# Patient Record
Sex: FEMALE | Race: WHITE | NOT HISPANIC OR LATINO | Employment: FULL TIME | URBAN - METROPOLITAN AREA
[De-identification: names, ages, dates, MRNs, and addresses within clinical notes are randomized per-mention and may not be internally consistent; named-entity substitution may affect disease eponyms.]

---

## 2023-12-02 ENCOUNTER — HOSPITAL ENCOUNTER (EMERGENCY)
Facility: HOSPITAL | Age: 23
Discharge: HOME | End: 2023-12-02
Attending: STUDENT IN AN ORGANIZED HEALTH CARE EDUCATION/TRAINING PROGRAM
Payer: COMMERCIAL

## 2023-12-02 ENCOUNTER — APPOINTMENT (OUTPATIENT)
Dept: RADIOLOGY | Facility: HOSPITAL | Age: 23
End: 2023-12-02
Payer: COMMERCIAL

## 2023-12-02 VITALS
SYSTOLIC BLOOD PRESSURE: 135 MMHG | DIASTOLIC BLOOD PRESSURE: 89 MMHG | WEIGHT: 180 LBS | HEART RATE: 78 BPM | BODY MASS INDEX: 30.73 KG/M2 | TEMPERATURE: 96.8 F | OXYGEN SATURATION: 97 % | HEIGHT: 64 IN | RESPIRATION RATE: 16 BRPM

## 2023-12-02 DIAGNOSIS — R10.84 GENERALIZED ABDOMINAL PAIN: Primary | ICD-10-CM

## 2023-12-02 LAB
ALBUMIN SERPL BCP-MCNC: 4.3 G/DL (ref 3.4–5)
ALP SERPL-CCNC: 39 U/L (ref 33–110)
ALT SERPL W P-5'-P-CCNC: 11 U/L (ref 7–45)
ANION GAP SERPL CALC-SCNC: 10 MMOL/L (ref 10–20)
APPEARANCE UR: CLEAR
AST SERPL W P-5'-P-CCNC: 14 U/L (ref 9–39)
BASOPHILS # BLD AUTO: 0.03 X10*3/UL (ref 0–0.1)
BASOPHILS NFR BLD AUTO: 0.3 %
BILIRUB SERPL-MCNC: 0.5 MG/DL (ref 0–1.2)
BILIRUB UR STRIP.AUTO-MCNC: NEGATIVE MG/DL
BUN SERPL-MCNC: 9 MG/DL (ref 6–23)
CALCIUM SERPL-MCNC: 9.3 MG/DL (ref 8.6–10.3)
CHLORIDE SERPL-SCNC: 103 MMOL/L (ref 98–107)
CO2 SERPL-SCNC: 27 MMOL/L (ref 21–32)
COLOR UR: NORMAL
CREAT SERPL-MCNC: 0.55 MG/DL (ref 0.5–1.05)
EOSINOPHIL # BLD AUTO: 0.27 X10*3/UL (ref 0–0.7)
EOSINOPHIL NFR BLD AUTO: 2.7 %
ERYTHROCYTE [DISTWIDTH] IN BLOOD BY AUTOMATED COUNT: 11.9 % (ref 11.5–14.5)
GFR SERPL CREATININE-BSD FRML MDRD: >90 ML/MIN/1.73M*2
GLUCOSE SERPL-MCNC: 90 MG/DL (ref 74–99)
GLUCOSE UR STRIP.AUTO-MCNC: NEGATIVE MG/DL
HCG UR QL IA.RAPID: NEGATIVE
HCT VFR BLD AUTO: 41.5 % (ref 36–46)
HGB BLD-MCNC: 13.7 G/DL (ref 12–16)
HOLD SPECIMEN: NORMAL
IMM GRANULOCYTES # BLD AUTO: 0.02 X10*3/UL (ref 0–0.7)
IMM GRANULOCYTES NFR BLD AUTO: 0.2 % (ref 0–0.9)
KETONES UR STRIP.AUTO-MCNC: NEGATIVE MG/DL
LEUKOCYTE ESTERASE UR QL STRIP.AUTO: NEGATIVE
LIPASE SERPL-CCNC: 14 U/L (ref 9–82)
LYMPHOCYTES # BLD AUTO: 1.4 X10*3/UL (ref 1.2–4.8)
LYMPHOCYTES NFR BLD AUTO: 14.1 %
MAGNESIUM SERPL-MCNC: 1.96 MG/DL (ref 1.6–2.4)
MCH RBC QN AUTO: 28.5 PG (ref 26–34)
MCHC RBC AUTO-ENTMCNC: 33 G/DL (ref 32–36)
MCV RBC AUTO: 86 FL (ref 80–100)
MONOCYTES # BLD AUTO: 0.38 X10*3/UL (ref 0.1–1)
MONOCYTES NFR BLD AUTO: 3.8 %
NEUTROPHILS # BLD AUTO: 7.85 X10*3/UL (ref 1.2–7.7)
NEUTROPHILS NFR BLD AUTO: 78.9 %
NITRITE UR QL STRIP.AUTO: NEGATIVE
NRBC BLD-RTO: 0 /100 WBCS (ref 0–0)
PH UR STRIP.AUTO: 6 [PH]
PLATELET # BLD AUTO: 327 X10*3/UL (ref 150–450)
POTASSIUM SERPL-SCNC: 4 MMOL/L (ref 3.5–5.3)
PROT SERPL-MCNC: 7.8 G/DL (ref 6.4–8.2)
PROT UR STRIP.AUTO-MCNC: NEGATIVE MG/DL
RBC # BLD AUTO: 4.81 X10*6/UL (ref 4–5.2)
RBC # UR STRIP.AUTO: NEGATIVE /UL
SODIUM SERPL-SCNC: 136 MMOL/L (ref 136–145)
SP GR UR STRIP.AUTO: 1.01
UROBILINOGEN UR STRIP.AUTO-MCNC: <2 MG/DL
WBC # BLD AUTO: 10 X10*3/UL (ref 4.4–11.3)

## 2023-12-02 PROCEDURE — 74177 CT ABD & PELVIS W/CONTRAST: CPT | Performed by: RADIOLOGY

## 2023-12-02 PROCEDURE — 74177 CT ABD & PELVIS W/CONTRAST: CPT

## 2023-12-02 PROCEDURE — 36415 COLL VENOUS BLD VENIPUNCTURE: CPT | Performed by: STUDENT IN AN ORGANIZED HEALTH CARE EDUCATION/TRAINING PROGRAM

## 2023-12-02 PROCEDURE — 99285 EMERGENCY DEPT VISIT HI MDM: CPT | Performed by: STUDENT IN AN ORGANIZED HEALTH CARE EDUCATION/TRAINING PROGRAM

## 2023-12-02 PROCEDURE — 96374 THER/PROPH/DIAG INJ IV PUSH: CPT | Mod: 59

## 2023-12-02 PROCEDURE — 81003 URINALYSIS AUTO W/O SCOPE: CPT | Performed by: STUDENT IN AN ORGANIZED HEALTH CARE EDUCATION/TRAINING PROGRAM

## 2023-12-02 PROCEDURE — 83735 ASSAY OF MAGNESIUM: CPT | Performed by: STUDENT IN AN ORGANIZED HEALTH CARE EDUCATION/TRAINING PROGRAM

## 2023-12-02 PROCEDURE — 85025 COMPLETE CBC W/AUTO DIFF WBC: CPT | Performed by: STUDENT IN AN ORGANIZED HEALTH CARE EDUCATION/TRAINING PROGRAM

## 2023-12-02 PROCEDURE — 81025 URINE PREGNANCY TEST: CPT | Performed by: EMERGENCY MEDICINE

## 2023-12-02 PROCEDURE — 2500000001 HC RX 250 WO HCPCS SELF ADMINISTERED DRUGS (ALT 637 FOR MEDICARE OP): Performed by: STUDENT IN AN ORGANIZED HEALTH CARE EDUCATION/TRAINING PROGRAM

## 2023-12-02 PROCEDURE — 83690 ASSAY OF LIPASE: CPT | Performed by: STUDENT IN AN ORGANIZED HEALTH CARE EDUCATION/TRAINING PROGRAM

## 2023-12-02 PROCEDURE — 2550000001 HC RX 255 CONTRASTS: Performed by: STUDENT IN AN ORGANIZED HEALTH CARE EDUCATION/TRAINING PROGRAM

## 2023-12-02 PROCEDURE — 99284 EMERGENCY DEPT VISIT MOD MDM: CPT | Mod: 25 | Performed by: STUDENT IN AN ORGANIZED HEALTH CARE EDUCATION/TRAINING PROGRAM

## 2023-12-02 PROCEDURE — 80053 COMPREHEN METABOLIC PANEL: CPT | Performed by: STUDENT IN AN ORGANIZED HEALTH CARE EDUCATION/TRAINING PROGRAM

## 2023-12-02 PROCEDURE — 2500000004 HC RX 250 GENERAL PHARMACY W/ HCPCS (ALT 636 FOR OP/ED)

## 2023-12-02 RX ORDER — MORPHINE SULFATE 4 MG/ML
4 INJECTION, SOLUTION INTRAMUSCULAR; INTRAVENOUS ONCE
Status: COMPLETED | OUTPATIENT
Start: 2023-12-02 | End: 2023-12-02

## 2023-12-02 RX ORDER — LIDOCAINE HYDROCHLORIDE 20 MG/ML
5 SOLUTION OROPHARYNGEAL ONCE
Status: COMPLETED | OUTPATIENT
Start: 2023-12-02 | End: 2023-12-02

## 2023-12-02 RX ORDER — ALUMINUM HYDROXIDE, MAGNESIUM HYDROXIDE, AND SIMETHICONE 1200; 120; 1200 MG/30ML; MG/30ML; MG/30ML
30 SUSPENSION ORAL ONCE
Status: COMPLETED | OUTPATIENT
Start: 2023-12-02 | End: 2023-12-02

## 2023-12-02 RX ADMIN — IOHEXOL 75 ML: 350 INJECTION, SOLUTION INTRAVENOUS at 11:45

## 2023-12-02 RX ADMIN — ALUMINUM HYDROXIDE, MAGNESIUM HYDROXIDE, AND SIMETHICONE 30 ML: 200; 200; 20 SUSPENSION ORAL at 11:57

## 2023-12-02 RX ADMIN — LIDOCAINE HYDROCHLORIDE 5 ML: 20 SOLUTION ORAL; TOPICAL at 11:35

## 2023-12-02 RX ADMIN — MORPHINE SULFATE 4 MG: 4 INJECTION, SOLUTION INTRAMUSCULAR; INTRAVENOUS at 12:05

## 2023-12-02 ASSESSMENT — PAIN SCALES - GENERAL
PAINLEVEL_OUTOF10: 4
PAINLEVEL_OUTOF10: 4
PAINLEVEL_OUTOF10: 8
PAINLEVEL_OUTOF10: 4
PAINLEVEL_OUTOF10: 7

## 2023-12-02 ASSESSMENT — COLUMBIA-SUICIDE SEVERITY RATING SCALE - C-SSRS
2. HAVE YOU ACTUALLY HAD ANY THOUGHTS OF KILLING YOURSELF?: NO
1. IN THE PAST MONTH, HAVE YOU WISHED YOU WERE DEAD OR WISHED YOU COULD GO TO SLEEP AND NOT WAKE UP?: NO
6. HAVE YOU EVER DONE ANYTHING, STARTED TO DO ANYTHING, OR PREPARED TO DO ANYTHING TO END YOUR LIFE?: NO

## 2023-12-02 ASSESSMENT — LIFESTYLE VARIABLES
HAVE PEOPLE ANNOYED YOU BY CRITICIZING YOUR DRINKING: NO
EVER HAD A DRINK FIRST THING IN THE MORNING TO STEADY YOUR NERVES TO GET RID OF A HANGOVER: NO
REASON UNABLE TO ASSESS: NO
HAVE YOU EVER FELT YOU SHOULD CUT DOWN ON YOUR DRINKING: NO
EVER FELT BAD OR GUILTY ABOUT YOUR DRINKING: NO

## 2023-12-02 ASSESSMENT — PAIN - FUNCTIONAL ASSESSMENT
PAIN_FUNCTIONAL_ASSESSMENT: 0-10
PAIN_FUNCTIONAL_ASSESSMENT: 0-10

## 2023-12-02 ASSESSMENT — PAIN DESCRIPTION - ORIENTATION: ORIENTATION: MID

## 2023-12-02 ASSESSMENT — PAIN DESCRIPTION - LOCATION: LOCATION: ABDOMEN

## 2023-12-02 NOTE — DISCHARGE INSTRUCTIONS
Please follow up with your primary care physician within 1-2 days.  Please follow-up with your GI doctor by calling for an appointment.  If you have worsening pain, vomiting, or other concerning symptoms, please seek immediate medical attention and come back into the ER.    If you have a return or worsening of symptoms, please seek immediate medical attention.

## 2023-12-02 NOTE — ED PROVIDER NOTES
"EMERGENCY DEPARTMENT ENCOUNTER      Pt Name: Josue Adam  MRN: [id number]  Birthdate 2000  Date of evaluation: 12/2/2023  Provider: Hugo Gamble DO    CHIEF COMPLAINT       Chief Complaint   Patient presents with    Abdominal Pain     Recent endoscopy with \"healing ulcer\"         HISTORY OF PRESENT ILLNESS    HPI  23-year-old female presents emergency room chief complaint of abdominal pain.  Patient indicates that for the past 24 hours she has had dull gnawing abdominal pain.  She saw GI in the past for the same similar pain GI was on consult and scoped her and saw that she had an ulcer that was partially healed.  They placed her on GI medications for ulcer treatment.  She indicates that today the pain is similar but worse hurts to move she had multiple bouts of nausea vomiting diarrhea.  She indicates 6-7 bouts of diarrhea this morning. She is on carafate and pantoprazole.  Nursing Notes were reviewed.    PAST MEDICAL HISTORY   History reviewed. No pertinent past medical history.      SURGICAL HISTORY     History reviewed. No pertinent surgical history.      CURRENT MEDICATIONS       There are no discharge medications for this patient.      ALLERGIES     Patient has no known allergies.    FAMILY HISTORY     No family history on file.       SOCIAL HISTORY       Social History     Socioeconomic History    Marital status: Single     Spouse name: None    Number of children: None    Years of education: None    Highest education level: None   Occupational History    None   Tobacco Use    Smoking status: Never    Smokeless tobacco: Never   Vaping Use    Vaping Use: Every day   Substance and Sexual Activity    Alcohol use: Never    Drug use: Not Currently    Sexual activity: None   Other Topics Concern    None   Social History Narrative    None     Social Determinants of Health     Financial Resource Strain: Not on file   Food Insecurity: Not on file   Transportation Needs: Not on file   Physical Activity: Not on " file   Stress: Not on file   Social Connections: Not on file   Intimate Partner Violence: Not on file   Housing Stability: Not on file       SCREENINGS                        PHYSICAL EXAM    (up to 7 for level 4, 8 or more for level 5)     ED Triage Vitals [12/02/23 0852]   Temp Heart Rate Resp BP   36 °C (96.8 °F) 88 18 137/90      SpO2 Temp Source Heart Rate Source Patient Position   96 % Temporal -- --      BP Location FiO2 (%)     -- --       Physical Exam  Vitals and nursing note reviewed.   Constitutional:       General: She is not in acute distress.     Appearance: Normal appearance. She is not ill-appearing or toxic-appearing.   HENT:      Head: Normocephalic and atraumatic.      Nose: Nose normal.      Mouth/Throat:      Mouth: Mucous membranes are moist.      Pharynx: Oropharynx is clear.   Eyes:      Extraocular Movements: Extraocular movements intact.      Conjunctiva/sclera: Conjunctivae normal.   Cardiovascular:      Rate and Rhythm: Normal rate and regular rhythm.      Pulses: Normal pulses.      Heart sounds: Normal heart sounds.   Pulmonary:      Effort: Pulmonary effort is normal. No respiratory distress.      Breath sounds: Normal breath sounds.   Abdominal:      General: Bowel sounds are normal. There is no distension.      Palpations: Abdomen is soft.      Tenderness: There is generalized abdominal tenderness. There is no guarding or rebound.   Musculoskeletal:         General: Normal range of motion.   Skin:     General: Skin is warm and dry.      Capillary Refill: Capillary refill takes less than 2 seconds.   Neurological:      General: No focal deficit present.      Mental Status: She is alert. Mental status is at baseline.   Psychiatric:         Mood and Affect: Mood normal.         Behavior: Behavior normal.         Thought Content: Thought content normal.         Judgment: Judgment normal.          DIAGNOSTIC RESULTS     LABS:  Labs Reviewed   CBC WITH AUTO DIFFERENTIAL - Abnormal        Result Value    WBC 10.0      nRBC 0.0      RBC 4.81      Hemoglobin 13.7      Hematocrit 41.5      MCV 86      MCH 28.5      MCHC 33.0      RDW 11.9      Platelets 327      Neutrophils % 78.9      Immature Granulocytes %, Automated 0.2      Lymphocytes % 14.1      Monocytes % 3.8      Eosinophils % 2.7      Basophils % 0.3      Neutrophils Absolute 7.85 (*)     Immature Granulocytes Absolute, Automated 0.02      Lymphocytes Absolute 1.40      Monocytes Absolute 0.38      Eosinophils Absolute 0.27      Basophils Absolute 0.03     HCG, URINE, QUALITATIVE - Normal    HCG, Urine NEGATIVE     URINALYSIS WITH REFLEX MICROSCOPIC AND CULTURE - Normal    Color, Urine Straw      Appearance, Urine Clear      Specific Gravity, Urine 1.008      pH, Urine 6.0      Protein, Urine NEGATIVE      Glucose, Urine NEGATIVE      Blood, Urine NEGATIVE      Ketones, Urine NEGATIVE      Bilirubin, Urine NEGATIVE      Urobilinogen, Urine <2.0      Nitrite, Urine NEGATIVE      Leukocyte Esterase, Urine NEGATIVE     MAGNESIUM - Normal    Magnesium 1.96     COMPREHENSIVE METABOLIC PANEL - Normal    Glucose 90      Sodium 136      Potassium 4.0      Chloride 103      Bicarbonate 27      Anion Gap 10      Urea Nitrogen 9      Creatinine 0.55      eGFR >90      Calcium 9.3      Albumin 4.3      Alkaline Phosphatase 39      Total Protein 7.8      AST 14      Bilirubin, Total 0.5      ALT 11     LIPASE - Normal    Lipase 14      Narrative:     Venipuncture immediately after or during the administration of Metamizole may lead to falsely low results. Testing should be performed immediately prior to Metamizole dosing.   URINALYSIS WITH REFLEX MICROSCOPIC AND CULTURE    Narrative:     The following orders were created for panel order Urinalysis with Reflex Microscopic and Culture.  Procedure                               Abnormality         Status                     ---------                               -----------         ------                      Urinalysis with Reflex M...[[id number]]  Normal              Final result               Extra Urine Gray Tube[[id number]]                            Final result                 Please view results for these tests on the individual orders.   EXTRA URINE GRAY TUBE    Extra Tube Hold for add-ons.         All other labs were within normal range or not returned as of this dictation.    Imaging  CT abdomen pelvis w IV contrast   Final Result   1.  Unremarkable CT the abdomen pelvis.             MACRO:   None        Signed by: Miracle Lyles 12/2/2023 11:54 AM   Dictation workstation:   [id number]           Procedures  Procedures     EMERGENCY DEPARTMENT COURSE/MDM:     Diagnoses as of 12/05/23 0013   Generalized abdominal pain   23-year-old female presents emergency room chief complaint of abdominal pain.      Nontoxic appearing female, in no acute distress.  Will obtain lab work including abdominal labs as well as CT of the abdomen/pelvis.  Patient appears well.  No peritoneal signs.  No guarding.  No rigidity.    Lab work was unremarkable.  CT of the abdomen pelvis showed no acute findings.  Still think that this could be an ulcer.  Patient does have a follow-up with her GI doctor.  She was given morphine and GI cocktail which she states seemed to help with her symptoms.  She feels comfortable going home.  Given strict return precautions.    Patient hemodynamically stable. Updated about results. All questions and concerns addressed. Patient discharged in stable condition.      Medical Decision Making      Patient and or family in agreement and understanding of treatment plan.  All questions answered.      I reviewed the case with the attending ED physician. The attending ED physician agrees with the plan. Patient and/or patient´s representative was counseled regarding labs, imaging, likely diagnosis, and plan. All questions were answered.    ED Medications administered this visit:    Medications   morphine injection 4  mg (4 mg intravenous Given 12/2/23 1205)   lidocaine (Xylocaine) 2 % mouth solution 5 mL (5 mL oral Given 12/2/23 1135)   alum-mag hydroxide-simeth (Mylanta) 200-200-20 mg/5 mL oral suspension 30 mL (30 mL oral Given 12/2/23 1157)   iohexol (OMNIPaque) 350 mg iodine/mL solution 75 mL (75 mL intravenous Given 12/2/23 1145)       New Prescriptions from this visit:    There are no discharge medications for this patient.      Follow-up:  Your primary care doctor    Schedule an appointment as soon as possible for a visit       Your GI doctor    Schedule an appointment as soon as possible for a visit           Final Impression:   1. Generalized abdominal pain          (Please note that portions of this note were completed with a voice recognition program.  Efforts were made to edit the dictations but occasionally words are mis-transcribed.)     Lokesh Stewart MD  Resident  12/02/23 1320    The patient was seen by the resident/fellow.  I have personally performed a substantive portion of the encounter.  I have seen and examined the patient; agree with the workup, evaluation, MDM, management and diagnosis.  The care plan has been discussed with the resident; I have reviewed the resident’s note and agree with the documented findings.           Hugo Gamble,   12/05/23 0013

## 2024-06-06 ENCOUNTER — HOSPITAL ENCOUNTER (OUTPATIENT)
Dept: RADIOLOGY | Facility: HOSPITAL | Age: 24
Discharge: HOME | End: 2024-06-06
Payer: COMMERCIAL

## 2024-06-06 DIAGNOSIS — M54.50 LOW BACK PAIN, UNSPECIFIED: ICD-10-CM

## 2024-06-06 DIAGNOSIS — G89.29 OTHER CHRONIC PAIN: ICD-10-CM

## 2024-06-06 PROCEDURE — 72114 X-RAY EXAM L-S SPINE BENDING: CPT

## 2024-06-06 PROCEDURE — 72114 X-RAY EXAM L-S SPINE BENDING: CPT | Performed by: RADIOLOGY

## 2025-02-18 ENCOUNTER — HOSPITAL ENCOUNTER (EMERGENCY)
Facility: HOSPITAL | Age: 25
Discharge: LEFT BEFORE DISCHARGE | End: 2025-02-18
Payer: COMMERCIAL

## 2025-02-18 PROCEDURE — 4500999001 HC ED NO CHARGE

## 2025-03-06 ENCOUNTER — APPOINTMENT (OUTPATIENT)
Dept: PRIMARY CARE | Facility: CLINIC | Age: 25
End: 2025-03-06
Payer: COMMERCIAL

## 2025-03-06 VITALS
DIASTOLIC BLOOD PRESSURE: 97 MMHG | WEIGHT: 179 LBS | SYSTOLIC BLOOD PRESSURE: 140 MMHG | HEART RATE: 97 BPM | TEMPERATURE: 97.7 F | OXYGEN SATURATION: 98 % | RESPIRATION RATE: 18 BRPM | BODY MASS INDEX: 30.73 KG/M2

## 2025-03-06 DIAGNOSIS — Z00.00 ANNUAL PHYSICAL EXAM: ICD-10-CM

## 2025-03-06 DIAGNOSIS — M54.50 LUMBAR PAIN WITH RADIATION DOWN BOTH LEGS: Primary | ICD-10-CM

## 2025-03-06 DIAGNOSIS — M79.604 LUMBAR PAIN WITH RADIATION DOWN BOTH LEGS: Primary | ICD-10-CM

## 2025-03-06 DIAGNOSIS — M54.2 NECK PAIN: ICD-10-CM

## 2025-03-06 DIAGNOSIS — F90.0 ATTENTION DEFICIT HYPERACTIVITY DISORDER (ADHD), PREDOMINANTLY INATTENTIVE TYPE: ICD-10-CM

## 2025-03-06 DIAGNOSIS — M54.6 CHRONIC BILATERAL THORACIC BACK PAIN: ICD-10-CM

## 2025-03-06 DIAGNOSIS — M79.605 LUMBAR PAIN WITH RADIATION DOWN BOTH LEGS: Primary | ICD-10-CM

## 2025-03-06 DIAGNOSIS — G89.29 CHRONIC BILATERAL THORACIC BACK PAIN: ICD-10-CM

## 2025-03-06 DIAGNOSIS — N62 LARGE BREASTS: ICD-10-CM

## 2025-03-06 PROBLEM — R41.840 ATTENTION DISTURBANCE: Status: ACTIVE | Noted: 2025-03-06

## 2025-03-06 PROBLEM — F33.0 MILD EPISODE OF RECURRENT MAJOR DEPRESSIVE DISORDER (CMS-HCC): Status: ACTIVE | Noted: 2025-03-06

## 2025-03-06 PROBLEM — K58.9 IBS (IRRITABLE BOWEL SYNDROME): Status: ACTIVE | Noted: 2025-03-05

## 2025-03-06 PROCEDURE — 99204 OFFICE O/P NEW MOD 45 MIN: CPT | Performed by: FAMILY MEDICINE

## 2025-03-06 PROCEDURE — 1036F TOBACCO NON-USER: CPT | Performed by: FAMILY MEDICINE

## 2025-03-06 RX ORDER — LEVONORGESTREL AND ETHINYL ESTRADIOL 0.15-0.03
1 KIT ORAL DAILY
COMMUNITY
Start: 2025-02-24

## 2025-03-06 ASSESSMENT — PATIENT HEALTH QUESTIONNAIRE - PHQ9
10. IF YOU CHECKED OFF ANY PROBLEMS, HOW DIFFICULT HAVE THESE PROBLEMS MADE IT FOR YOU TO DO YOUR WORK, TAKE CARE OF THINGS AT HOME, OR GET ALONG WITH OTHER PEOPLE: SOMEWHAT DIFFICULT
1. LITTLE INTEREST OR PLEASURE IN DOING THINGS: SEVERAL DAYS
2. FEELING DOWN, DEPRESSED OR HOPELESS: NOT AT ALL
SUM OF ALL RESPONSES TO PHQ9 QUESTIONS 1 AND 2: 1

## 2025-03-06 ASSESSMENT — PAIN SCALES - GENERAL: PAINLEVEL_OUTOF10: 5

## 2025-03-06 NOTE — PROGRESS NOTES
Subjective   Patient ID: Estrada Adam is a 24 y.o. female who presents for New Patient Visit (Lower back / leg-left).    HPI   Back pain started in teens 13  Worsening for an year, missed work in last 4 months  Radiating to hip   Intermittent tingling    Neckpain and upper back from huge breasts.  Chest feels heavy  Breast tenderness : can be unbearable at times    H/o Gastric ulcers: 2021  IBS with constipation ( in past diagnosed with mixed IBS)    Diagnosed with ADHD in college 2022: took Wellbutyrin that did not work  Took Straterra : has to discontinue due to side effects.  Would like restarted on medication.      Review of Systems   All other systems reviewed and are negative.      Objective   BP (!) 140/97 (BP Location: Left arm, Patient Position: Sitting, BP Cuff Size: Adult)   Pulse 97   Temp 36.5 °C (97.7 °F) (Temporal)   Resp 18   Wt 81.2 kg (179 lb)   SpO2 98%   BMI 30.73 kg/m²     Physical Exam  Vitals and nursing note reviewed.   Cardiovascular:      Rate and Rhythm: Normal rate and regular rhythm.   Pulmonary:      Effort: Pulmonary effort is normal.      Breath sounds: Normal breath sounds.   Musculoskeletal:      Cervical back: Spasms and tenderness present.      Thoracic back: Spasms and tenderness present.      Lumbar back: Deformity present. Negative right straight leg raise test and negative left straight leg raise test.   Neurological:      Mental Status: She is alert.   Psychiatric:         Mood and Affect: Mood normal.         Behavior: Behavior normal.         Thought Content: Thought content normal.         Judgment: Judgment normal.         Assessment/Plan   Diagnoses and all orders for this visit:  Lumbar pain with radiation down both legs  -     Referral to Physical Therapy; Future  Annual physical exam  -     TSH; Future  -     Comprehensive Metabolic Panel; Future  -     CBC and Auto Differential; Future  -     Hemoglobin A1c; Future  -     Lipid panel; Future  Attention deficit  hyperactivity disorder (ADHD), predominantly inattentive type  -     Referral to Psychiatry; Future  Chronic bilateral thoracic back pain  -     Referral to Physical Therapy; Future  Neck pain  -     Referral to Physical Therapy; Future  Large breasts  Other orders  -     Follow Up In Primary Care - Established; Future

## 2025-03-12 LAB
ALBUMIN SERPL-MCNC: 4.2 G/DL (ref 3.6–5.1)
ALP SERPL-CCNC: 43 U/L (ref 31–125)
ALT SERPL-CCNC: 20 U/L (ref 6–29)
ANION GAP SERPL CALCULATED.4IONS-SCNC: 8 MMOL/L (CALC) (ref 7–17)
AST SERPL-CCNC: 20 U/L (ref 10–30)
BASOPHILS # BLD AUTO: 50 CELLS/UL (ref 0–200)
BASOPHILS NFR BLD AUTO: 0.6 %
BILIRUB SERPL-MCNC: 0.5 MG/DL (ref 0.2–1.2)
BUN SERPL-MCNC: 10 MG/DL (ref 7–25)
CALCIUM SERPL-MCNC: 8.9 MG/DL (ref 8.6–10.2)
CHLORIDE SERPL-SCNC: 107 MMOL/L (ref 98–110)
CO2 SERPL-SCNC: 23 MMOL/L (ref 20–32)
CREAT SERPL-MCNC: 0.6 MG/DL (ref 0.5–0.96)
EGFRCR SERPLBLD CKD-EPI 2021: 128 ML/MIN/1.73M2
EOSINOPHIL # BLD AUTO: 100 CELLS/UL (ref 15–500)
EOSINOPHIL NFR BLD AUTO: 1.2 %
ERYTHROCYTE [DISTWIDTH] IN BLOOD BY AUTOMATED COUNT: 12.4 % (ref 11–15)
EST. AVERAGE GLUCOSE BLD GHB EST-MCNC: 105 MG/DL
EST. AVERAGE GLUCOSE BLD GHB EST-SCNC: 5.8 MMOL/L
GLUCOSE SERPL-MCNC: 87 MG/DL (ref 65–99)
HBA1C MFR BLD: 5.3 % OF TOTAL HGB
HCT VFR BLD AUTO: 43.8 % (ref 35–45)
HGB BLD-MCNC: 14.5 G/DL (ref 11.7–15.5)
LYMPHOCYTES # BLD AUTO: 1959 CELLS/UL (ref 850–3900)
LYMPHOCYTES NFR BLD AUTO: 23.6 %
MCH RBC QN AUTO: 29.4 PG (ref 27–33)
MCHC RBC AUTO-ENTMCNC: 33.1 G/DL (ref 32–36)
MCV RBC AUTO: 88.7 FL (ref 80–100)
MONOCYTES # BLD AUTO: 357 CELLS/UL (ref 200–950)
MONOCYTES NFR BLD AUTO: 4.3 %
NEUTROPHILS # BLD AUTO: 5835 CELLS/UL (ref 1500–7800)
NEUTROPHILS NFR BLD AUTO: 70.3 %
PLATELET # BLD AUTO: 351 THOUSAND/UL (ref 140–400)
PMV BLD REES-ECKER: 9.7 FL (ref 7.5–12.5)
POTASSIUM SERPL-SCNC: 4.1 MMOL/L (ref 3.5–5.3)
PROT SERPL-MCNC: 7.1 G/DL (ref 6.1–8.1)
RBC # BLD AUTO: 4.94 MILLION/UL (ref 3.8–5.1)
SODIUM SERPL-SCNC: 138 MMOL/L (ref 135–146)
TSH SERPL-ACNC: 0.72 MIU/L
WBC # BLD AUTO: 8.3 THOUSAND/UL (ref 3.8–10.8)

## 2025-03-24 NOTE — PROGRESS NOTES
Physical Therapy    Physical Therapy Evaluation    Patient Name: Josue Adam  MRN: [id number]  Today's Date: 3/25/2025  Time Calculation  Start Time: 0245  Stop Time: 0330  Time Calculation (min): 45 min    Problem List Items Addressed This Visit    None  Visit Diagnoses         Codes    Lumbar pain with radiation down both legs     M54.50, M79.604, M79.605    Relevant Orders    Follow Up In Physical Therapy    Chronic bilateral thoracic back pain     M54.6, G89.29    Relevant Orders    Follow Up In Physical Therapy    Neck pain     M54.2            Insurance:  Visit number: 1 of VISITS ARE MED NEC NO AUTH NEEDED   Insurance Type: Payor: [employer] / Plan: [employer] HMP / Product Type: *No Product type* /   Authorization or Plan of Care date Range: not required.    General:  Reason for visit: Referred to PT for chronic neck/thoracic/back pain. Xray + mild levo curvature of the lumbar spine.  Surgery: No surgery found  Date of Last Surgery: No surgery found   Post-Op Days: No surgery found   Referred by: Farshad Seymour MD  Next MD appt:  NA     Precautions:  IBS, chronic constipation, depressive disorder, ADHD,   Fall Risk: Moderate     Assessment:   LBP with LLE radicular symptoms.  Likely L5.  + LLE slump, femoral nerve, and SLR.      Clinical Presentation: Evolving with changing characteristics    Impairments: Pain, Active ROM, Strength, Activity limitations, Fall risk, Flexibility, Motor function/control, Joint mobility, Decreased functional level, Participation restrictions, and Sensory    Functional Limitations: Missing work, sitting, standing, walking, sleeping, transfers. Bed mobility.    Recommended Treatment:  Therapeutic exercise, Manual therapy, Gait training, Home program instruction and progression, Neuromuscular re-education, Therapeutic activities, Self care and home management, Instruction in activity modification, Electrical stimulation, Vasopneumatic device + cold, Cryotherapy, and Dry  Needling      Plan:  Plan of care was developed with input and agreement by the patient.  1-2 x week x 6 weeks.    Rehab Potential:   Good to achieve goals.    Goals:  Short Term Goals:  -Patient to be Indep with HEP for self management of symptoms    -Abolish LLE radiating/radicular symptoms    Long Term Goals:  -Modified Oswestry: 0-19% impairment to indicate a significant improvement in overall function.    -Patient will demonstrate correct posture with min to no cueing to allow for correct loading strategy.    -Patient will report LLE is not giving out with ambulation.     -Patient will demo mild to no limitation AROM of the lumbar spine to allow for correct mechanics with functional mobility.     -Patient will report standing 45 minutes  without pain to allow for return to work and ADLs without limitation.     -Patient will report sitting 45 minutes without pain to allow for return to work and ADLs without limitation.    -Patient will demonstrate appropriate body mechanics for household and common activities to reduce incidence or future back pain exacerbations    Subjective:  CC:  chronic LBP without Leg pain for 10 years.  Over past 1 year LBP worsening. And over past 2-3 months is experiencing L lower leg tingling and LLE is giving out - knee feels weak.  Has missed work recently which has not been an issue in the past.  Reports experiencing LBP to knee pulling sensation.  Tingling and altered sensation LLE to the great toe.  LLE has been giving out.  In the past 2 weeks she has fell 6 x b/c LLE gives out.  Walking > 5  minutes L knee gives out.  Can't stand up straight due to LB pinching sensation - for the past 3 month.  Constant back pain 5 -8 /10.  LLE pain intermittent as of about 1 week ago - ranges from 5-8/10.   Pain reduction: Heating pad - but no longer helps.  can't take ibuprofen due to allergy.  Has tried naproxen and tylenol.  Comfortable positions ease up the pain.    Back pain effects  sleeping.  Movement is aggravating.  Was given steroid which helped for a couple days.  CHON:  NKI to this back pain that started 10 yrs ago.    DOI:  10 years ago, but worse the past 1 year.    MEDICAL MANAGEMENT: Referred to Physical Therapy, Radiographs, and Physical therapy in the past  PLOF: Episodic pain, but manageable.  FUNCTIONAL LIMITATIONS: Reaching, Lifting, Dressing, Bathing, Sleep is interrupted., Stair negotiation, Working , Walking >   minutes, Standing >   minutes, and Sitting >   minutes   WORK:  [employer] - Sit to Stand- frequent position changes.  EXERCISE:  has not exercises in about 1 year  Patient Stated Goal:  ease back and neck pain and leg pain to bearable levels.    Medical History Form: Reviewed (scanned into chart)    Objective:   Gait: The patient is ambulating with the following device: she is not using a device.. Gait deviations include: Moderately antalgic and walks with a stiff knee gait.  Sit to Stand Transfers: independent and Moderately antalgic  Bed Mobility: independent and Moderately antalgic    Denies: Loss of bladder or bowel function Change in bowel / bladder function that correlates with onset of LBP Saddle anesthesia History of malignancy Osteoporosis    - POSTURE:  Decreased lumbar lordosis.     LE DERMATOMES:  Dorsal Second Toe Web Space (L5): R/L: Intact / Impaired      LOWER EXTREMITY MYOTOMES:  Lower extremity myotomes are WNL bilaterally.  Hip Flex (L2) R/L: 5 / 5  Knee Ext (L3) R/L: 5 / 5   Ankle DF (L4) R/L: 5 / 5  Great Toe Ext (L5) R/L: 5 / 4  Ankle PF (S1) R/L:  5 / 5  Heel Walking (L5): 5 / 5  Toe Walking (S1): 5 / 5      LUMBAR AROM;   LUMBAR TEST MOVEMENTS IN STANDING - Movement Loss indicated by Major, Moderate, Minimal, or Nil: .  FIS: Major  EIS: Major    LUMBAR TEST MOVEMENTS IN LYING - Movement Loss indicated by Major, Moderate, Minimal, or Nil: .  Did not assess due to pain.  Did start with patient prone over 2 pillows which is comfortable for her  "and reduces pain to 2-3/10 in lower back with some tingling in the L great toe/lower leg      Special Tests for Lumbar Spine: .  Straight leg raise R/L: negative / positive   Slump R/L: negative/ positive   Femoral nerve tension R/L: negative /positive      Outcome Measures:  Other Measures  Oswestry Disablity Index (EMILY): 46    Treatment Performed: (\"NP\" = Not Performed)     Therapeutic Exercise:     15 minutes  Pts HEP:   Prone over 2 pillows alternating with Prone over 2 pillows on elbows x 30 seconds x 20 minutes, 3 x per day.  We did this in clinic.  Prone over 2 pillows offers reduced LBP to 2-3/10 and slight tingling primarily L5.    Next session: please work on manual therapy to the lower back including soft tissue work and gently mobilizations, consider TENS.  If irritability is low can consider adding femoral nerve glides and sciatic nerve glides.   If she has change in ability to urinate.  Or if LE weakness intensifies she needs to contact her MD.     Manual Therapy:       minutes      Neuromuscular Re-education:      minutes      Gait Training:            minutes      Aquatics:            minutes      Therapeutic Activity:      minutes      Modalities:       Vasopneumatic Device       minutes  Electrical Stimulation          minutes  Ultrasound            minutes  Iontophoresis                     minutes  Cold Pack            minutes  Mechanical Traction           minutes    Self Care Home Management:    minutes    Canalith Reposition:          minutes     Education:          minutes    Other:       minutes      Evaluation Complexity: Low: 30 minutes; Moderate   minutes; Complex PT Evaluation Time Entry: 30;  minutes    Re-Evaluation:   minutes   "

## 2025-03-25 ENCOUNTER — EVALUATION (OUTPATIENT)
Dept: PHYSICAL THERAPY | Facility: CLINIC | Age: 25
End: 2025-03-25
Payer: COMMERCIAL

## 2025-03-25 DIAGNOSIS — M54.6 CHRONIC BILATERAL THORACIC BACK PAIN: ICD-10-CM

## 2025-03-25 DIAGNOSIS — M54.2 NECK PAIN: ICD-10-CM

## 2025-03-25 DIAGNOSIS — G89.29 CHRONIC BILATERAL THORACIC BACK PAIN: ICD-10-CM

## 2025-03-25 DIAGNOSIS — M79.605 LUMBAR PAIN WITH RADIATION DOWN BOTH LEGS: ICD-10-CM

## 2025-03-25 DIAGNOSIS — M79.604 LUMBAR PAIN WITH RADIATION DOWN BOTH LEGS: ICD-10-CM

## 2025-03-25 DIAGNOSIS — M54.50 LUMBAR PAIN WITH RADIATION DOWN BOTH LEGS: ICD-10-CM

## 2025-03-25 PROCEDURE — 97110 THERAPEUTIC EXERCISES: CPT | Mod: GP | Performed by: PHYSICAL THERAPIST

## 2025-03-25 PROCEDURE — 97161 PT EVAL LOW COMPLEX 20 MIN: CPT | Mod: GP | Performed by: PHYSICAL THERAPIST

## 2025-04-01 ENCOUNTER — TREATMENT (OUTPATIENT)
Dept: PHYSICAL THERAPY | Facility: CLINIC | Age: 25
End: 2025-04-01
Payer: COMMERCIAL

## 2025-04-01 DIAGNOSIS — M79.605 LUMBAR PAIN WITH RADIATION DOWN BOTH LEGS: Primary | ICD-10-CM

## 2025-04-01 DIAGNOSIS — M54.50 LUMBAR PAIN WITH RADIATION DOWN BOTH LEGS: Primary | ICD-10-CM

## 2025-04-01 DIAGNOSIS — G89.29 CHRONIC BILATERAL THORACIC BACK PAIN: ICD-10-CM

## 2025-04-01 DIAGNOSIS — M79.604 LUMBAR PAIN WITH RADIATION DOWN BOTH LEGS: Primary | ICD-10-CM

## 2025-04-01 DIAGNOSIS — M54.6 CHRONIC BILATERAL THORACIC BACK PAIN: ICD-10-CM

## 2025-04-01 PROCEDURE — 97140 MANUAL THERAPY 1/> REGIONS: CPT | Mod: GP | Performed by: PHYSICAL THERAPIST

## 2025-04-01 PROCEDURE — 97014 ELECTRIC STIMULATION THERAPY: CPT | Mod: GP | Performed by: PHYSICAL THERAPIST

## 2025-04-01 PROCEDURE — 97110 THERAPEUTIC EXERCISES: CPT | Mod: GP | Performed by: PHYSICAL THERAPIST

## 2025-04-01 NOTE — PROGRESS NOTES
"                                                                                                                         PHYSICAL THERAPY TREATMENT NOTE    Patient Name:  Josue Adam \"Estrada\"   Patient MRN: [id number]  Date: 4/1/2025  Time Calculation  Start Time: 0700  Stop Time: 0745  Time Calculation (min): 45 min      Problem List Items Addressed This Visit             ICD-10-CM    Lumbar pain with radiation down both legs - Primary M54.50, M79.604, M79.605    Chronic bilateral thoracic back pain M54.6, G89.29       Insurance:  Visit number: 2 of VISITS ARE MED NEC NO AUTH NEEDED   Insurance Type: Payor: [employer] / Plan: [employer] HMP / Product Type: *No Product type* /   Authorization or Plan of Care date Range: not required.    General:  Reason for visit: chronic LBP with LLE radicular tingling/pain.  Highly irritable LBP.  Xray + mild levo curvature of the lumbar spine.  Surgery: No surgery found  Date of Last Surgery: No surgery found   Post-Op Days: No surgery found   Referred by: Farshad Seymour MD  Next MD appt:  NA     Precautions:  IBS, chronic constipation, depressive disorder, ADHD,   Fall Risk: Moderate    Subjective:    Slightly improved LBP.  Rated at 3/10 entering clinic with LLE tingling to great toe..     Pain (0-10): 4    HEP adherence / understanding:  she reports HEP compliance    Assessment:   Education: Reviewed home exercise program.  Progress towards functional goals: Patient reports there has not been a significant change in functional abilities.  Response to interventions:  bike: increased LBP from 3 to 4-5/10 and had some increased L thigh pain.  Tolerated prone and DTM to L lumbar paraspinals well with reduction in LBP and LLE pain.  Justification for continued skilled care: reduce LBP and LLE radiating symptoms.    Plan:  Manual therapy to improve joint mobility. Modalities as needed to address symptoms.    Objective:       Treatment Performed: (\"NP\" = Not Performed)     Therapeutic " Exercise:     5 minutes  Pts HEP:   Prone over 2 pillows alternating with Prone over 2 pillows on elbows x 30 seconds x 20 minutes, 3 x per day.  We did this in clinic.  Prone over 2 pillows offers reduced LBP to 2-3/10 and slight tingling primarily L5.    Next session: please work on manual therapy to the lower back including soft tissue work and gently mobilizations, consider TENS.  If irritability is low can consider adding femoral nerve glides and sciatic nerve glides.   If she has change in ability to urinate.  Or if LE weakness intensifies she needs to contact her MD.     Manual Therapy:     20 minutes  Prone over 2 Pillows.  DTM to primarily the L lumbar paraspinals and few minutes to the R paraspinals.    Neuromuscular Re-education:      minutes      Modalities:       Vasopneumatic Device       minutes  Electrical Stimulation        15 minutes  Ultrasound            minutes  Iontophoresis                     minutes  Cold Pack            minutes  Mechanical Traction           minutes    Gait Training:            minutes      Aquatics:            minutes      Therapeutic Activity:      minutes      Self Care Home Management:    minutes    Canalith Reposition:          minutes     Education:          minutes    Other:       minutes      Evaluation Complexity: Low:   minutes; Moderate   minutes; Complex   minutes    Re-Evaluation:   minutes            declines

## 2025-04-10 ENCOUNTER — TREATMENT (OUTPATIENT)
Dept: PHYSICAL THERAPY | Facility: CLINIC | Age: 25
End: 2025-04-10
Payer: COMMERCIAL

## 2025-04-10 DIAGNOSIS — M54.50 LUMBAR PAIN WITH RADIATION DOWN BOTH LEGS: ICD-10-CM

## 2025-04-10 DIAGNOSIS — M54.6 CHRONIC BILATERAL THORACIC BACK PAIN: ICD-10-CM

## 2025-04-10 DIAGNOSIS — M79.605 LUMBAR PAIN WITH RADIATION DOWN BOTH LEGS: ICD-10-CM

## 2025-04-10 DIAGNOSIS — G89.29 CHRONIC BILATERAL THORACIC BACK PAIN: ICD-10-CM

## 2025-04-10 DIAGNOSIS — M79.604 LUMBAR PAIN WITH RADIATION DOWN BOTH LEGS: ICD-10-CM

## 2025-04-10 PROCEDURE — 97014 ELECTRIC STIMULATION THERAPY: CPT | Mod: GP | Performed by: PHYSICAL THERAPIST

## 2025-04-10 PROCEDURE — 97140 MANUAL THERAPY 1/> REGIONS: CPT | Mod: GP | Performed by: PHYSICAL THERAPIST

## 2025-04-10 PROCEDURE — 97110 THERAPEUTIC EXERCISES: CPT | Mod: GP | Performed by: PHYSICAL THERAPIST

## 2025-04-10 NOTE — PROGRESS NOTES
"                                                                                                                         PHYSICAL THERAPY TREATMENT NOTE    Patient Name:  Josue Adam \"Estrada\"   Patient MRN: [id number]  Date: 4/10/2025  Time Calculation  Start Time: 0530  Stop Time: 0610  Time Calculation (min): 40 min      Problem List Items Addressed This Visit             ICD-10-CM    Lumbar pain with radiation down both legs M54.50, M79.604, M79.605    Chronic bilateral thoracic back pain M54.6, G89.29       Insurance:  Visit number: 3 of VISITS ARE MED NEC NO AUTH NEEDED   Insurance Type: Payor: ANTHEM / Plan: [employer] / Product Type: *No Product type* /   Authorization or Plan of Care date Range: not required.    General:  Reason for visit: chronic LBP with LLE radicular tingling/pain.  Highly irritable LBP.  Xray + mild levo curvature of the lumbar spine.  Surgery: No surgery found  Date of Last Surgery: No surgery found   Post-Op Days: No surgery found   Referred by: Farshad Seymour MD  Next MD appt:  NA     Precautions:  IBS, chronic constipation, depressive disorder, ADHD,   Fall Risk: Moderate    Subjective:    Slightly improved LBP.  Rated at 6/10 entering clinic with LLE tingling to great toe..     Pain (0-10): 4    HEP adherence / understanding:  she reports HEP compliance    Assessment:   Education: Reviewed home exercise program.  Progress towards functional goals: Patient reports there has not been a significant change in functional abilities.  Continues to experience 6/10 L LBP and constant tingling from back to L great toe.  Standing / walking seem to be most aggravating.  Having difficulty with transfers due to severe LBP.  Bed mobility today is moderately to severely antalgic.  Gait is mildly deviated.      Response to interventions: Discussed addition of Dry needling with Stim.  Patient is in agreement.  Risks and screening questions were read with her.  Justification for continued skilled " "care: reduce LBP and LLE radiating symptoms.    Plan:  Manual therapy to improve joint mobility. Modalities as needed to address symptoms.    Objective:       Treatment Performed: (\"NP\" = Not Performed)     Therapeutic Exercise:     15 minutes  Prone over 2 pillows, immediately reduced symptoms to 3/10 LBP continues to have tingling to toe but reduced intensity.x 1 minute.  Rotated b/e prone lie and KIM.  Performed manual therapy with patient in prone over 1 pillow    Next session: please work on manual therapy to the lower back including soft tissue work and gently mobilizations, consider TENS.  If irritability is low can consider adding femoral nerve glides and sciatic nerve glides.   If she has change in ability to urinate.  Or if LE weakness intensifies she needs to contact her MD.     Manual Therapy:     15 minutes  Prone over 1 Pillows.  DTM to primarily the L lumbar paraspinals and few minutes to the R paraspinals.  Dry Needling to the Bilateral lumbar paraspinals L4/5/S1, and superior cluneal - TENS applied.    Neuromuscular Re-education:      minutes      Modalities:       Vasopneumatic Device       minutes  Electrical Stimulation        8 minutes applied with needling.  Ultrasound            minutes  Iontophoresis                     minutes  Cold Pack            minutes  Mechanical Traction           minutes    Gait Training:            minutes      Aquatics:            minutes      Therapeutic Activity:      minutes      Self Care Home Management:    minutes    Canalith Reposition:          minutes     Education:          minutes    Other:       minutes      Evaluation Complexity: Low:   minutes; Moderate   minutes; Complex   minutes    Re-Evaluation:   minutes           "

## 2025-04-15 ENCOUNTER — TELEPHONE (OUTPATIENT)
Dept: PRIMARY CARE | Facility: CLINIC | Age: 25
End: 2025-04-15

## 2025-04-15 ENCOUNTER — TREATMENT (OUTPATIENT)
Dept: PHYSICAL THERAPY | Facility: CLINIC | Age: 25
End: 2025-04-15
Payer: COMMERCIAL

## 2025-04-15 DIAGNOSIS — M79.604 LUMBAR PAIN WITH RADIATION DOWN BOTH LEGS: Primary | ICD-10-CM

## 2025-04-15 DIAGNOSIS — M54.50 LUMBAR PAIN WITH RADIATION DOWN BOTH LEGS: Primary | ICD-10-CM

## 2025-04-15 DIAGNOSIS — Z78.9 USES BIRTH CONTROL: Primary | ICD-10-CM

## 2025-04-15 DIAGNOSIS — M54.6 CHRONIC BILATERAL THORACIC BACK PAIN: ICD-10-CM

## 2025-04-15 DIAGNOSIS — M79.605 LUMBAR PAIN WITH RADIATION DOWN BOTH LEGS: Primary | ICD-10-CM

## 2025-04-15 DIAGNOSIS — G89.29 CHRONIC BILATERAL THORACIC BACK PAIN: ICD-10-CM

## 2025-04-15 PROCEDURE — 97140 MANUAL THERAPY 1/> REGIONS: CPT | Mod: GP | Performed by: PHYSICAL THERAPIST

## 2025-04-15 PROCEDURE — 97014 ELECTRIC STIMULATION THERAPY: CPT | Mod: GP | Performed by: PHYSICAL THERAPIST

## 2025-04-15 RX ORDER — LEVONORGESTREL AND ETHINYL ESTRADIOL 0.15-0.03
1 KIT ORAL DAILY
Qty: 28 TABLET | Refills: 12 | Status: SHIPPED | OUTPATIENT
Start: 2025-04-15

## 2025-04-15 NOTE — PROGRESS NOTES
"                                                                                                                         PHYSICAL THERAPY TREATMENT NOTE    Patient Name:  Josue Adam \"Estrada\"   Patient MRN: [id number]  Date: 4/15/2025  Time Calculation  Start Time: 1045  Stop Time: 1120  Time Calculation (min): 35 min      Problem List Items Addressed This Visit             ICD-10-CM    Lumbar pain with radiation down both legs - Primary M54.50, M79.604, M79.605    Chronic bilateral thoracic back pain M54.6, G89.29     Insurance:  Visit number: 4 of VISITS ARE MED NEC NO AUTH NEEDED   Insurance Type: Payor: ANTHEM / Plan: [employer]MP / Product Type: *No Product type* /   Authorization or Plan of Care date Range: not required.    General:  Reason for visit: chronic LBP with LLE radicular tingling/pain.  Highly irritable LBP.  Xray + mild levo curvature of the lumbar spine.  Surgery: No surgery found  Date of Last Surgery: No surgery found   Post-Op Days: No surgery found   Referred by: Farshad Seymour MD  Next MD appt:  NA     Precautions:  IBS, chronic constipation, depressive disorder, ADHD,   Fall Risk: Moderate    Subjective:   Improved LBP to 3/10 today.  Continues to have LBP and pain/tingling to the great toe.  Feels her symptoms are improving.  Easier to get up and down stairs.   Pain (0-10): 3   HEP adherence / understanding:  she reports HEP compliance    Assessment:   Education: Reviewed home exercise program.  Progress towards functional goals:Reports easier to go up and down stairs.  Pain is 3/10 today which is good.  Does not feel able to go for a walk due to this pain.  Needs assist to rise up from a couch.  Reduced pain today to 3/10 L LBP and constant tingling from back to L great toe.  Standing / walking seem to be most aggravating.  Having difficulty with transfers due to severe LBP.  Bed mobility today is moderately to severely antalgic.  Gait is mildly deviated.      Response to interventions: " "Seemed to have reduction in symptoms after TENS + Dry needling last session so we continued with that and manual therapy today.  Justification for continued skilled care: reduce LBP and LLE radiating symptoms.    Plan:  Manual therapy to improve joint mobility. Modalities as needed to address symptoms.    Objective:       Treatment Performed: (\"NP\" = Not Performed)     Therapeutic Exercise:       minutes  Prone over 2 pillows, immediately reduced symptoms to 3/10 LBP continues to have tingling to toe but reduced intensity.x 1 minute.  Rotated b/e prone lie and KIM.  Performed manual therapy with patient in prone over 1 pillow    Manual Therapy:     20 minutes  Prone over 1 Pillows.  DTM to primarily the L lumbar paraspinals and few minutes to the R paraspinals.  Dry Needling to the Bilateral lumbar paraspinals L4/5/S1, and L superior cluneal - TENS applied.    Neuromuscular Re-education:      minutes      Modalities:       Vasopneumatic Device       minutes  Electrical Stimulation        10 minutes applied with needling.  Ultrasound            minutes  Iontophoresis                     minutes  Cold Pack            minutes  Mechanical Traction           minutes    Gait Training:            minutes      Aquatics:            minutes      Therapeutic Activity:      minutes      Self Care Home Management:    minutes    Canalith Reposition:          minutes     Education:          minutes    Other:       minutes      Evaluation Complexity: Low:   minutes; Moderate   minutes; Complex   minutes    Re-Evaluation:   minutes           "

## 2025-04-18 ENCOUNTER — TREATMENT (OUTPATIENT)
Dept: PHYSICAL THERAPY | Facility: CLINIC | Age: 25
End: 2025-04-18
Payer: COMMERCIAL

## 2025-04-18 DIAGNOSIS — M54.50 LUMBAR PAIN WITH RADIATION DOWN BOTH LEGS: Primary | ICD-10-CM

## 2025-04-18 DIAGNOSIS — M54.6 CHRONIC BILATERAL THORACIC BACK PAIN: ICD-10-CM

## 2025-04-18 DIAGNOSIS — M79.604 LUMBAR PAIN WITH RADIATION DOWN BOTH LEGS: Primary | ICD-10-CM

## 2025-04-18 DIAGNOSIS — G89.29 CHRONIC BILATERAL THORACIC BACK PAIN: ICD-10-CM

## 2025-04-18 DIAGNOSIS — M79.605 LUMBAR PAIN WITH RADIATION DOWN BOTH LEGS: Primary | ICD-10-CM

## 2025-04-18 PROCEDURE — 97014 ELECTRIC STIMULATION THERAPY: CPT | Mod: GP | Performed by: PHYSICAL THERAPIST

## 2025-04-18 PROCEDURE — 97110 THERAPEUTIC EXERCISES: CPT | Mod: GP | Performed by: PHYSICAL THERAPIST

## 2025-04-18 PROCEDURE — 97140 MANUAL THERAPY 1/> REGIONS: CPT | Mod: GP | Performed by: PHYSICAL THERAPIST

## 2025-04-18 NOTE — PROGRESS NOTES
"                                                                                                                         PHYSICAL THERAPY TREATMENT NOTE    Patient Name:  Josue Adam \"Estrada\"   Patient MRN: [id number]  Date: 4/18/2025  Time Calculation  Start Time: 0700  Stop Time: 0745  Time Calculation (min): 45 min      Problem List Items Addressed This Visit           ICD-10-CM    Lumbar pain with radiation down both legs - Primary M54.50, M79.604, M79.605    Chronic bilateral thoracic back pain M54.6, G89.29       Insurance:  Visit number: 5 of VISITS ARE MED NEC NO AUTH NEEDED   Insurance Type: Payor: ANTHEM / Plan: [employer]MP / Product Type: *No Product type* /   Authorization or Plan of Care date Range: not required.    General:  Reason for visit: chronic LBP with LLE radicular tingling/pain.  Highly irritable LBP.  Xray + mild levo curvature of the lumbar spine.  Referred by: Farshad Seymour MD Next MD appt:  NA     Precautions:  IBS, chronic constipation, depressive disorder, ADHD,   Fall Risk: Moderate    Subjective:   Improved LBP to 2/10 today.  Continues to have LBP and pain/tingling to the great toe.  Feels her symptoms are improving.  Easier to get up and down stairs.  Feeling well this morning.  Walking is aggravating.   Pain (0-10): 2   HEP adherence / understanding:  she reports HEP compliance    Assessment:   Education: Reviewed home exercise program.   HEP issued with Black and Green bands.  Progress towards functional goals:  Reports easier to go up and down stairs.   At home - Needs assist to rise up from a couch due to LBP with the transition.  Reduced Pain is 2/10 today which is an improvement for her.  L LBP and constant tingling from back to L great toe.  Standing / walking seem to be most aggravating.  Having difficulty with transfers due to severe LBP.  Bed mobility today is much improved and only mildly antalgic.  Gait is mildly deviated.               Response to interventions: Over " "the past few sessions her pain is reducing and she seems to have a reduction in symptoms after TENS + Dry needling last session so we continued with that and manual therapy today.                 Justification for continued skilled care: reduce LBP and LLE radiating symptoms.        Plan:  Manual therapy to improve joint mobility. Modalities as needed to address symptoms.  Dry needling.    Objective:       Treatment Performed: (\"NP\" = Not Performed)     Therapeutic Exercise:     15 minutes  HEP: Access Code: [email or web address]    Prone over 2 pillows, immediately reduced symptoms to 3/10 LBP continues to have tingling to toe but reduced intensity.x 1 minute.  Rotated b/e prone lie and KIM.  Performed manual therapy with patient in prone over 1 pillow - NP  Nustep x 5 minutes  Row/LAE:  Black x 15 ea  PALOFF: Green x 15   Chest Press: Green x 15     Manual Therapy:     20 minutes  Prone over 1 Pillows.  DTM to primarily the L lumbar paraspinals and few minutes to the R paraspinals.  Dry Needling to the Bilateral lumbar paraspinals L4/5/S1, and L superior cluneal - TENS applied.    Neuromuscular Re-education:      minutes      Modalities:       Vasopneumatic Device       minutes  Electrical Stimulation        10 minutes applied with needling.  Ultrasound            minutes  Iontophoresis                     minutes  Cold Pack            minutes  Mechanical Traction           minutes    Gait Training:            minutes      Aquatics:            minutes      Therapeutic Activity:      minutes      Self Care Home Management:    minutes    Canalith Reposition:          minutes     Education:          minutes    Other:       minutes      Evaluation Complexity: Low:   minutes; Moderate   minutes; Complex   minutes    Re-Evaluation:   minutes           "

## 2025-04-23 ENCOUNTER — TREATMENT (OUTPATIENT)
Dept: PHYSICAL THERAPY | Facility: CLINIC | Age: 25
End: 2025-04-23
Payer: COMMERCIAL

## 2025-04-23 DIAGNOSIS — M79.604 LUMBAR PAIN WITH RADIATION DOWN BOTH LEGS: Primary | ICD-10-CM

## 2025-04-23 DIAGNOSIS — G89.29 CHRONIC BILATERAL THORACIC BACK PAIN: ICD-10-CM

## 2025-04-23 DIAGNOSIS — M79.605 LUMBAR PAIN WITH RADIATION DOWN BOTH LEGS: Primary | ICD-10-CM

## 2025-04-23 DIAGNOSIS — M54.50 LUMBAR PAIN WITH RADIATION DOWN BOTH LEGS: Primary | ICD-10-CM

## 2025-04-23 DIAGNOSIS — M54.6 CHRONIC BILATERAL THORACIC BACK PAIN: ICD-10-CM

## 2025-04-23 LAB
CHOLEST SERPL-MCNC: 220 MG/DL
CHOLEST/HDLC SERPL: 4.8 (CALC)
HDLC SERPL-MCNC: 46 MG/DL
LDLC SERPL CALC-MCNC: 153 MG/DL (CALC)
NONHDLC SERPL-MCNC: 174 MG/DL (CALC)
TRIGL SERPL-MCNC: 99 MG/DL

## 2025-04-23 PROCEDURE — 97140 MANUAL THERAPY 1/> REGIONS: CPT | Mod: GP | Performed by: PHYSICAL THERAPIST

## 2025-04-23 PROCEDURE — 97110 THERAPEUTIC EXERCISES: CPT | Mod: GP | Performed by: PHYSICAL THERAPIST

## 2025-04-23 NOTE — PROGRESS NOTES
"                                                                                                                         PHYSICAL THERAPY TREATMENT NOTE    Patient Name:  Josue Adam \"Estrada\"   Patient MRN: [id number]  Date: 4/23/2025  Time Calculation  Start Time: 1050  Stop Time: 1135  Time Calculation (min): 45 min      Problem List Items Addressed This Visit           ICD-10-CM    Lumbar pain with radiation down both legs - Primary M54.50, M79.604, M79.605    Chronic bilateral thoracic back pain M54.6, G89.29     Insurance:  Visit number: 6 of VISITS ARE MED NEC NO AUTH NEEDED   Insurance Type: Payor: ANTHEM / Plan: [employer]MP / Product Type: *No Product type* /   Authorization or Plan of Care date Range: not required.    General:  Reason for visit: chronic LBP with LLE radicular tingling/pain.  Highly irritable LBP.  Xray + mild levo curvature of the lumbar spine.  Referred by: Farshad Seymour MD Next MD appt:  NA     Precautions:  IBS, chronic constipation, depressive disorder, ADHD,   Fall Risk: Moderate    Subjective:    Patient reports  tingling in the LE is improving.  Feels her LLE strength is improving.  Lying to sitting to painful.   Pain (0-10): 2 /10  localized L sided LBP   HEP adherence / understanding: patient reports compliance with the instructed home exercises.    Assessment:   Education:  reviewed HEP.  She may transition to to no pillows with prone lying.  Added wall sit s to HEP as well.    Progress towards functional goals:  Reduced pain in lower back, reduced tingling LLE.  Intermittent tingling LLE.  Response to interventions: Patient tolerated therapeutic interventions well and without any adverse events.  Justification for continued skilled care:  she is making good progress with less pain.    Plan:  Exercises targeting surrounding musculature to stabilize and improve function. Manual therapy to improve joint mobility.    Objective:       Treatment Performed: (\"NP\" = Not Performed) " "    Therapeutic Exercise:     25 minutes  HEP: Access Code: [email or web address]    Prone over 2 pillows, immediately reduced symptoms to 3/10 LBP continues to have tingling to toe but reduced intensity.x 1 minute.  Rotated b/e prone lie and KIM.  Performed manual therapy with patient in prone over 1 pillow - NP  Nustep x 5 minutes  Row/LAE:  Black x 15 ea  PALOFF: Green x 15   Chest Press: Green x 15   Wall sit: x 10, 5\" hold     Manual Therapy:     20 minutes  Prone over 1 Pillows.  DTM to primarily the L lumbar paraspinals and few minutes to the R paraspinals.  Dry Needling to the Bilateral lumbar paraspinals L4/5/S1, and L superior cluneal - TENS applied.    Neuromuscular Re-education:      minutes      Modalities:       Vasopneumatic Device       minutes  Electrical Stimulation          minutes applied with needling.  Ultrasound            minutes  Iontophoresis                     minutes  Cold Pack            minutes  Mechanical Traction           minutes    Gait Training:            minutes      Aquatics:            minutes      Therapeutic Activity:      minutes      Self Care Home Management:    minutes    Canalith Reposition:          minutes     Education:          minutes    Other:       minutes      Evaluation Complexity: Low:   minutes; Moderate   minutes; Complex   minutes    Re-Evaluation:   minutes           "

## 2025-04-25 ENCOUNTER — TREATMENT (OUTPATIENT)
Dept: PHYSICAL THERAPY | Facility: CLINIC | Age: 25
End: 2025-04-25
Payer: COMMERCIAL

## 2025-04-25 DIAGNOSIS — M54.6 CHRONIC BILATERAL THORACIC BACK PAIN: ICD-10-CM

## 2025-04-25 DIAGNOSIS — G89.29 CHRONIC BILATERAL THORACIC BACK PAIN: ICD-10-CM

## 2025-04-25 DIAGNOSIS — M79.604 LUMBAR PAIN WITH RADIATION DOWN BOTH LEGS: Primary | ICD-10-CM

## 2025-04-25 DIAGNOSIS — M54.50 LUMBAR PAIN WITH RADIATION DOWN BOTH LEGS: Primary | ICD-10-CM

## 2025-04-25 DIAGNOSIS — M79.605 LUMBAR PAIN WITH RADIATION DOWN BOTH LEGS: Primary | ICD-10-CM

## 2025-04-25 PROCEDURE — 97014 ELECTRIC STIMULATION THERAPY: CPT | Mod: GP | Performed by: PHYSICAL THERAPIST

## 2025-04-25 PROCEDURE — 97140 MANUAL THERAPY 1/> REGIONS: CPT | Mod: GP | Performed by: PHYSICAL THERAPIST

## 2025-04-25 PROCEDURE — 97110 THERAPEUTIC EXERCISES: CPT | Mod: GP | Performed by: PHYSICAL THERAPIST

## 2025-04-25 NOTE — PROGRESS NOTES
Preventive Health Recommendations  Male Ages 26 - 39    Yearly exam:             See your health care provider every year in order to  o   Review health changes.   o   Discuss preventive care.    o   Review your medicines if your doctor has prescribed any.    You should be tested each year for STDs (sexually transmitted diseases), if you re at risk.     After age 35, talk to your provider about cholesterol testing. If you are at risk for heart disease, have your cholesterol tested at least every 5 years.     If you are at risk for diabetes, you should have a diabetes test (fasting glucose).  Shots: Get a flu shot each year. Get a tetanus shot every 10 years.     Nutrition:    Eat at least 5 servings of fruits and vegetables daily.     Eat whole-grain bread, whole-wheat pasta and brown rice instead of white grains and rice.     Get adequate Calcium and Vitamin D.     Lifestyle    Exercise for at least 150 minutes a week (30 minutes a day, 5 days a week). This will help you control your weight and prevent disease.     Limit alcohol to one drink per day.     No smoking.     Wear sunscreen to prevent skin cancer.     See your dentist every six months for an exam and cleaning.      "                                                                                                                         PHYSICAL THERAPY TREATMENT NOTE    Patient Name:  Josue Adam \"Estrada\"   Patient MRN: [id number]  Date: 4/25/2025  Time Calculation  Start Time: 0700  Stop Time: 0750  Time Calculation (min): 50 min      Problem List Items Addressed This Visit           ICD-10-CM    Lumbar pain with radiation down both legs - Primary M54.50, M79.604, M79.605    Chronic bilateral thoracic back pain M54.6, G89.29       Insurance:  Visit number: 7 of VISITS ARE MED NEC NO AUTH NEEDED   Insurance Type: Payor: ANTHEM / Plan: [employer]M [employer]MP / Product Type: *No Product type* /   Authorization or Plan of Care date Range: not required.    General:  Reason for visit: chronic LBP with LLE radicular tingling/pain.  Highly irritable LBP.  Xray + mild levo curvature of the lumbar spine.  Referred by: Farshad Seymour MD Next MD appt:  NA     Precautions:  IBS, chronic constipation, depressive disorder, ADHD,   Fall Risk: Moderate    Subjective:    Was active yesterday and back is feeling increased pain.  Patient reports  setback the last few days.   Pain (0-10): pain 3-4/10 Left lower back.    HEP adherence / understanding: patient reports compliance with the instructed home exercises.    Assessment:   Education:  added prone knee bends to HEP; + femoral n tension as well as sciatic n tension on L only.  Progress towards functional goals:  increased irritability today so we opted for less exercise and  increased manual therapy.  Continues to respond to prone lie progression.  Supine is not comfortable  Response to interventions: Patient tolerated therapeutic interventions well and without any adverse events. Reduced pain post treatment.  Justification for continued skilled care: To address remaining functional, objective and subjective deficits to allow the patient to return to full independence with ADLs.    Plan:  Continue with " "prone progression.    Objective:     Treatment Performed: (\"NP\" = Not Performed)     Therapeutic Exercise:     15 minutes  HEP: Access Code: [email or web address]    Upright bike x 8 minutes  Prone over 1 pillow with alternating KIM/prone lie  Prone knee bends: x 10  (reduced tingling)  (verbally added to HEP on 4/25 3 x per day 10 reps.)  **ACTIVITIES BELOW WERE NOT PERFORMED**   Row/LAE:  Black x 15 ea  PALOFF: Green x 15   Chest Press: Green x 15   Wall sit: x 10, 5\" hold     Manual Therapy:     20 minutes  Prone over 1 Pillows.  Grade 1 and 2 mobilizations PA to the L3/4/5; DTM to primarily the L lumbar paraspinals and few minutes to the R paraspinals.  Dry Needling to the Bilateral lumbar paraspinals L4/5/S1, and L superior cluneal - TENS applied.    Neuromuscular Re-education:      minutes      Modalities:       Vasopneumatic Device       minutes  Electrical Stimulation        10 minutes applied with needling.  Ultrasound            minutes  Iontophoresis                     minutes  Cold Pack            minutes  Mechanical Traction           minutes    Gait Training:            minutes      Aquatics:            minutes      Therapeutic Activity:      minutes      Self Care Home Management:    minutes    Canalith Reposition:          minutes     Education:          minutes    Other:       minutes      Evaluation Complexity: Low:   minutes; Moderate   minutes; Complex   minutes    Re-Evaluation:   minutes           "

## 2025-04-28 ENCOUNTER — TREATMENT (OUTPATIENT)
Dept: PHYSICAL THERAPY | Facility: CLINIC | Age: 25
End: 2025-04-28
Payer: COMMERCIAL

## 2025-04-28 DIAGNOSIS — M79.605 LUMBAR PAIN WITH RADIATION DOWN BOTH LEGS: ICD-10-CM

## 2025-04-28 DIAGNOSIS — M79.604 LUMBAR PAIN WITH RADIATION DOWN BOTH LEGS: ICD-10-CM

## 2025-04-28 DIAGNOSIS — M54.6 CHRONIC BILATERAL THORACIC BACK PAIN: ICD-10-CM

## 2025-04-28 DIAGNOSIS — G89.29 CHRONIC BILATERAL THORACIC BACK PAIN: ICD-10-CM

## 2025-04-28 DIAGNOSIS — M54.50 LUMBAR PAIN WITH RADIATION DOWN BOTH LEGS: ICD-10-CM

## 2025-04-28 PROCEDURE — 97110 THERAPEUTIC EXERCISES: CPT | Mod: GP | Performed by: PHYSICAL THERAPIST

## 2025-04-28 PROCEDURE — 97140 MANUAL THERAPY 1/> REGIONS: CPT | Mod: GP | Performed by: PHYSICAL THERAPIST

## 2025-04-28 NOTE — PROGRESS NOTES
"                                                                                                                         PHYSICAL THERAPY TREATMENT NOTE    Patient Name:  oJsue Adam \"Estrada\"   Patient MRN: [id number]  Date: 4/28/2025  Time Calculation  Start Time: 0530  Stop Time: 0615  Time Calculation (min): 45 min      Problem List Items Addressed This Visit           ICD-10-CM    Lumbar pain with radiation down both legs M54.50, M79.604, M79.605    Chronic bilateral thoracic back pain M54.6, G89.29       Insurance:  Visit number: 8 of VISITS ARE MED NEC NO AUTH NEEDED   Insurance Type: Payor: ANTHEM / Plan: [employer]M [employer]MP / Product Type: *No Product type* /   Authorization or Plan of Care date Range: not required.    General:  Reason for visit: chronic LBP with LLE radicular tingling/pain.  Highly irritable LBP.  Xray + mild levo curvature of the lumbar spine.  Referred by: Farshad Seymour MD Next MD appt:  NA     Precautions:  IBS, chronic constipation, depressive disorder, ADHD,   Fall Risk: Moderate    Subjective:    Less pain overall, improved ability to move.   Feeling well today.  Reports localized 4/10 pain L Lower back.   Pain (0-10): 4   HEP adherence / understanding: patient reports compliance with the instructed home exercises.    Assessment:   Education: Reviewed home exercise program.  Progress towards functional goals: Improved ability to complete activities in the community. Improved tolerance to work related tasks. Reduced frequency of pain. Reduced intensity of pain.  Response to interventions: Patient tolerated therapeutic interventions well and without any adverse events.  Justification for continued skilled care: Reduce pain to improve function.    Plan:  Manual therapy to improve joint mobility.    Objective:     Treatment Performed: (\"NP\" = Not Performed)     Therapeutic Exercise:     35 minutes  HEP: Access Code: [email or web address]      Seated sciatic nerve stretching  Prone over 1 pillow with " "alternating KIM/prone lie  Prone knee bends: 3 x 10    Prone knee belt heel to butt/knee flexion stretching 3 x 10   Prone PU: x 5     **ACTIVITIES BELOW WERE NOT PERFORMED**   Upright bike x 8 minutes  Row/LAE:  Black x 15 ea  PALOFF: Green x 15   Chest Press: Green x 15   Wall sit: x 10, 5\" hold     Manual Therapy:     10 minutes  Prone over 1 Pillows.  Grade 1 and 2 mobilizations PA to the L3/4/5; DTM to primarily the L lumbar paraspinals and few minutes to the R paraspinals.    **ACTIVITIES BELOW WERE NOT PERFORMED**   Dry Needling to the Bilateral lumbar paraspinals L4/5/S1, and L superior cluneal - TENS applied.    Neuromuscular Re-education:      minutes      Modalities:       Vasopneumatic Device       minutes  Electrical Stimulation          minutes applied with needling.  Ultrasound            minutes  Iontophoresis                     minutes  Cold Pack            minutes  Mechanical Traction           minutes    Gait Training:            minutes      Aquatics:            minutes      Therapeutic Activity:      minutes      Self Care Home Management:    minutes    Canalith Reposition:          minutes     Education:          minutes    Other:       minutes      Evaluation Complexity: Low:   minutes; Moderate   minutes; Complex   minutes    Re-Evaluation:   minutes           "

## 2025-05-01 ENCOUNTER — TREATMENT (OUTPATIENT)
Dept: PHYSICAL THERAPY | Facility: CLINIC | Age: 25
End: 2025-05-01
Payer: COMMERCIAL

## 2025-05-01 DIAGNOSIS — M79.605 LUMBAR PAIN WITH RADIATION DOWN BOTH LEGS: Primary | ICD-10-CM

## 2025-05-01 DIAGNOSIS — M54.50 LUMBAR PAIN WITH RADIATION DOWN BOTH LEGS: Primary | ICD-10-CM

## 2025-05-01 DIAGNOSIS — M54.6 CHRONIC BILATERAL THORACIC BACK PAIN: ICD-10-CM

## 2025-05-01 DIAGNOSIS — M79.604 LUMBAR PAIN WITH RADIATION DOWN BOTH LEGS: Primary | ICD-10-CM

## 2025-05-01 DIAGNOSIS — G89.29 CHRONIC BILATERAL THORACIC BACK PAIN: ICD-10-CM

## 2025-05-01 PROCEDURE — 97110 THERAPEUTIC EXERCISES: CPT | Mod: GP | Performed by: PHYSICAL THERAPIST

## 2025-05-01 NOTE — PROGRESS NOTES
"                                                                                                                         PHYSICAL THERAPY TREATMENT NOTE    Patient Name:  Josue Adam \"Estrada\"   Patient MRN: [id number]  Date: 5/1/2025  Time Calculation  Start Time: 0520  Stop Time: 0600  Time Calculation (min): 40 min      Problem List Items Addressed This Visit           ICD-10-CM    Lumbar pain with radiation down both legs - Primary M54.50, M79.604, M79.605    Relevant Orders    Follow Up In Physical Therapy    Chronic bilateral thoracic back pain M54.6, G89.29    Relevant Orders    Follow Up In Physical Therapy       Insurance:  Visit number: 9 of VISITS ARE MED NEC NO AUTH NEEDED   Insurance Type: Payor: [employer] / Plan: [employer] / Product Type: *No Product type* /   Authorization or Plan of Care date Range: not required.    General:  Reason for visit: chronic LBP with LLE radicular tingling/pain.  Highly irritable LBP.  Xray + mild levo curvature of the lumbar spine.  Referred by: Farshad Seymour MD Next MD appt:  NA     Precautions:  IBS, chronic constipation, depressive disorder, ADHD,   Fall Risk: Moderate    Subjective:   Josue reports she feels like her condition is improving.    Pain (0-10):  4/10 left lateral proximal hip.  Back is not bad today. Worked all day today.  Was busy at work.  Jogged short distance, LLE did not give out.  Intermittent tingling.   HEP adherence / understanding: patient reports compliance with the instructed home exercises.    Assessment:   Education: Reviewed home exercise program.  Progress towards functional goals: improved LLE strength, reduced frequency, able to do short jog without LE giving out.  Response to interventions:  2/10 pain post tx.  Did not perform manual or needling today as patient was feeling well.  Justification for continued skilled care: To address remaining functional, objective and subjective deficits to allow the patient to return to full " "independence with ADLs.    Plan:  Modify based on symptoms.    Objective:       Treatment Performed: (\"NP\" = Not Performed)     Therapeutic Exercise:     40 minutes  HEP: Access Code: [email or web address]    Nustep x  5 minutes L3, Speed .  Unilateral Leg press LLE: 25# 2 x 10 ea LE  TKE Green x 15 with 5\" hold  Paloff resisted Rot: Red x 15 ea  Prone knee belt heel to butt/knee flexion stretching 3 x 10         **ACTIVITIES BELOW WERE NOT PERFORMED**   Upright bike x 8 minutes  Seated sciatic nerve stretching  Prone over 1 pillow with alternating KIM/prone lie  Prone knee bends: 3 x 10    Prone PU: x 5   Row/LAE:  Black x 15 ea  PALOFF: Green x 15   Chest Press: Green x 15   Wall sit: x 10, 5\" hold     Manual Therapy:       minutes  Prone over 1 Pillows.  Grade 1 and 2 mobilizations PA to the L3/4/5; DTM to primarily the L lumbar paraspinals and few minutes to the R paraspinals.    **ACTIVITIES BELOW WERE NOT PERFORMED**   Dry Needling to the Bilateral lumbar paraspinals L4/5/S1, and L superior cluneal - TENS applied.    Neuromuscular Re-education:      minutes      Modalities:       Vasopneumatic Device       minutes  Electrical Stimulation          minutes applied with needling.  Ultrasound            minutes  Iontophoresis                     minutes  Cold Pack            minutes  Mechanical Traction           minutes    Gait Training:            minutes      Aquatics:            minutes      Therapeutic Activity:      minutes      Self Care Home Management:    minutes    Canalith Reposition:          minutes     Education:          minutes    Other:       minutes      Evaluation Complexity: Low:   minutes; Moderate   minutes; Complex   minutes    Re-Evaluation:   minutes           "

## 2025-05-05 ENCOUNTER — APPOINTMENT (OUTPATIENT)
Dept: PHYSICAL THERAPY | Facility: CLINIC | Age: 25
End: 2025-05-05
Payer: COMMERCIAL

## 2025-05-08 ENCOUNTER — TREATMENT (OUTPATIENT)
Dept: PHYSICAL THERAPY | Facility: CLINIC | Age: 25
End: 2025-05-08
Payer: COMMERCIAL

## 2025-05-08 DIAGNOSIS — M54.50 LUMBAR PAIN WITH RADIATION DOWN BOTH LEGS: ICD-10-CM

## 2025-05-08 DIAGNOSIS — M54.6 CHRONIC BILATERAL THORACIC BACK PAIN: ICD-10-CM

## 2025-05-08 DIAGNOSIS — M79.604 LUMBAR PAIN WITH RADIATION DOWN BOTH LEGS: ICD-10-CM

## 2025-05-08 DIAGNOSIS — M79.605 LUMBAR PAIN WITH RADIATION DOWN BOTH LEGS: ICD-10-CM

## 2025-05-08 DIAGNOSIS — G89.29 CHRONIC BILATERAL THORACIC BACK PAIN: ICD-10-CM

## 2025-05-08 PROCEDURE — 97110 THERAPEUTIC EXERCISES: CPT | Mod: GP | Performed by: PHYSICAL THERAPIST

## 2025-05-08 PROCEDURE — 97140 MANUAL THERAPY 1/> REGIONS: CPT | Mod: GP | Performed by: PHYSICAL THERAPIST

## 2025-05-08 NOTE — PROGRESS NOTES
"                                                                                                                         PHYSICAL THERAPY TREATMENT NOTE    Patient Name:  Josue Adam \"Estrada\"   Patient MRN: [id number]  Date: 5/8/2025  Time Calculation  Start Time: 0525  Stop Time: 0610  Time Calculation (min): 45 min      Problem List Items Addressed This Visit           ICD-10-CM    Lumbar pain with radiation down both legs M54.50, M79.604, M79.605    Chronic bilateral thoracic back pain M54.6, G89.29       Insurance:  Visit number: 10 of VISITS ARE MED NEC NO AUTH NEEDED   Insurance Type: Payor: [employer]M / Plan: [employer] / Product Type: *No Product type* /   Authorization or Plan of Care date Range: not required.    General:  Reason for visit: chronic LBP with LLE radicular tingling/pain.  Highly irritable LBP.  Xray + mild levo curvature of the lumbar spine.  Referred by: Farshad Seymour MD Next MD appt:  NA     Precautions:  IBS, chronic constipation, depressive disorder, ADHD,   Fall Risk: Moderate    Subjective:   Patient reports stood / walked x 30 minutes two days ago and feels this caused her pain to significantly increase.  Pain (0-10): 4-5/10 localized LBP and just tingling in the LE'  She was feeling better prior to 5/6 stating back wasn't bad this weekend.   HEP adherence / understanding: patient reports compliance with the instructed home exercises.  HEP: prone and roadkill helped    Assessment:   Education: Reviewed home exercise program.  Progress towards functional goals: Was doing well, however after walking for 30 minutes left sided LBP increased and has been aggravated the past 2 days.  Response to interventions: No change in pain.  Really no better after treatment.  Justification for continued skilled care: Reduce pain to improve function.    Plan:  Exercises targeting surrounding musculature to stretch/strengthen and improve function. Manual therapy to improve joint mobility. Dry needling to " "reduce pain.    Objective:     Treatment Performed: (\"NP\" = Not Performed)     Therapeutic Exercise:     10 minutes  HEP: Access Code: [email or web address]  Prone over 2 pillows: abolished tingling in the Les.  Prone over 2 pillows glute sets  Prone over two pillows heel squeezes.  Prone knee belt heel to butt/knee flexion stretching x20     **ACTIVITIES BELOW WERE NOT PERFORMED**   Upright bike x 8 minutes  Nustep x  5 minutes L3, Speed .  Unilateral Leg press LLE: 25# 2 x 10 ea LE  TKE Green x 15 with 5\" hold  Paloff resisted Rot: Red x 15 ea  Seated sciatic nerve stretching  Prone over 1 pillow with alternating KIM/prone lie  Prone knee bends: 3 x 10    Prone PU: x 5   Row/LAE:  Black x 15 ea  PALOFF: Green x 15   Chest Press: Green x 15   Wall sit: x 10, 5\" hold     Manual Therapy:     15 minutes  Prone over 1 Pillows.  Grade 1 and 2 mobilizations PA to the left L3/4/5; DTM to primarily the L lumbar paraspinals   Dry Needling to the Bilateral lumbar paraspinals L4/5/S1, and L superior cluneal - TENS applied.    Neuromuscular Re-education:      minutes      Modalities:       Vasopneumatic Device       minutes  Electrical Stimulation          minutes applied with needling - 10 minutes.  Ultrasound            minutes  Iontophoresis                     minutes  Cold Pack            minutes  Mechanical Traction           minutes    Gait Training:            minutes      Aquatics:            minutes      Therapeutic Activity:      minutes      Self Care Home Management:    minutes    Canalith Reposition:          minutes     Education:          minutes    Other:       minutes      Evaluation Complexity: Low:   minutes; Moderate   minutes; Complex   minutes    Re-Evaluation:   minutes           "

## 2025-05-21 ENCOUNTER — TREATMENT (OUTPATIENT)
Dept: PHYSICAL THERAPY | Facility: CLINIC | Age: 25
End: 2025-05-21
Payer: COMMERCIAL

## 2025-05-21 DIAGNOSIS — M79.604 LUMBAR PAIN WITH RADIATION DOWN BOTH LEGS: Primary | ICD-10-CM

## 2025-05-21 DIAGNOSIS — G89.29 CHRONIC BILATERAL THORACIC BACK PAIN: ICD-10-CM

## 2025-05-21 DIAGNOSIS — M54.6 CHRONIC BILATERAL THORACIC BACK PAIN: ICD-10-CM

## 2025-05-21 DIAGNOSIS — M79.605 LUMBAR PAIN WITH RADIATION DOWN BOTH LEGS: Primary | ICD-10-CM

## 2025-05-21 DIAGNOSIS — M54.50 LUMBAR PAIN WITH RADIATION DOWN BOTH LEGS: Primary | ICD-10-CM

## 2025-05-21 PROCEDURE — 97110 THERAPEUTIC EXERCISES: CPT | Mod: GP | Performed by: PHYSICAL THERAPIST

## 2025-05-21 PROCEDURE — 97140 MANUAL THERAPY 1/> REGIONS: CPT | Mod: GP | Performed by: PHYSICAL THERAPIST

## 2025-05-21 NOTE — PROGRESS NOTES
"                                                                                                                         PHYSICAL THERAPY TREATMENT NOTE    Patient Name:  Josue Adam \"Estrada\"   Patient MRN: [id number]  Date: 5/21/2025  Time Calculation  Start Time: 0200  Stop Time: 0245  Time Calculation (min): 45 min      Problem List Items Addressed This Visit           ICD-10-CM    Lumbar pain with radiation down both legs - Primary M54.50, M79.604, M79.605    Chronic bilateral thoracic back pain M54.6, G89.29       Insurance:  Visit number: 11 of VISITS ARE MED NEC NO AUTH NEEDED   Insurance Type: Payor: [employer]M / Plan: [employer] / Product Type: *No Product type* /   Authorization or Plan of Care date Range: not required.    General:  Reason for visit: chronic LBP with LLE radicular tingling/pain.  Highly irritable LBP.  Xray + mild levo curvature of the lumbar spine.  Referred by: Farshad Seymour MD Next MD appt:  NA     Precautions:  IBS, chronic constipation, depressive disorder, ADHD,   Fall Risk: Moderate    Subjective:   Feels like she is consistently doing well.  Feels her symptoms are improving.  Can sleep thru the night.  Walking is limited to about 10 minutes due to LBP.      Pain (0-10): 2 /10    HEP adherence / understanding: patient reports compliance with the instructed home exercises.  She is doing prone exercises at home daily.    Assessment:   Education: Home exercise program instructed and issued. Asked to increase PPU from 9/day to 20-30, then gradually to 60  Progress towards functional goals: significant reduction in pain.  EMILY is 50% improved from last month.  LLE is not giving out.  Walking remains painful if she is walking > 10 minutes.  Sleeping thru the night.  Response to interventions: 2-3/10 pain with exercise.  Transfers supine to sit cause moderate pain.  Is able to perform a PPU with mild discomfort.  Justification for continued skilled care: Assess effectiveness of " "HEP.    Plan:  Manual therapy, exercise.  Treat based on symptoms.    Objective:       Treatment Performed: (\"NP\" = Not Performed)     Therapeutic Exercise:     35 minutes  HEP: Access Code: [email or web address]  Nustep x  10 minutes L3, Speed .  Row/LAE:  Black 2 x 15 ea  PALOFF walkouts: RED x 15   Chest Press: Green x 15   Wall sit: x 10, 5\" hold   Unilateral Leg press LLE: 25# 2 x 10 ea LE      **ACTIVITIES BELOW WERE NOT PERFORMED**   TKE Green x 15 with 5\" hold    Seated sciatic nerve stretching  Prone over 1 pillow with alternating KIM/prone lie  Prone knee bends: 3 x 10  ea LE  Prone PU: x 5     Manual Therapy:     10 minutes  Prone lie Grade 1 and 2 mobilizations CPA L3/4/5;   Prone PA L5 with PPU 3 x 5   **ACTIVITIES BELOW WERE NOT PERFORMED**   Dry Needling to the Bilateral lumbar paraspinals L4/5/S1, and L superior cluneal - TENS applied.    Neuromuscular Re-education:      minutes      Modalities:       Vasopneumatic Device       minutes  Electrical Stimulation          minutes applied with needling - 10 minutes.  Ultrasound            minutes  Iontophoresis                     minutes  Cold Pack            minutes  Mechanical Traction           minutes    Gait Training:            minutes      Aquatics:            minutes      Therapeutic Activity:      minutes      Self Care Home Management:    minutes    Canalith Reposition:          minutes     Education:          minutes    Other:       minutes      Evaluation Complexity: Low:   minutes; Moderate   minutes; Complex   minutes    Re-Evaluation:   minutes           "

## 2025-05-29 ENCOUNTER — TREATMENT (OUTPATIENT)
Dept: PHYSICAL THERAPY | Facility: CLINIC | Age: 25
End: 2025-05-29
Payer: COMMERCIAL

## 2025-05-29 DIAGNOSIS — M54.50 LUMBAR PAIN WITH RADIATION DOWN BOTH LEGS: ICD-10-CM

## 2025-05-29 DIAGNOSIS — M79.605 LUMBAR PAIN WITH RADIATION DOWN BOTH LEGS: ICD-10-CM

## 2025-05-29 DIAGNOSIS — G89.29 CHRONIC BILATERAL THORACIC BACK PAIN: ICD-10-CM

## 2025-05-29 DIAGNOSIS — M79.604 LUMBAR PAIN WITH RADIATION DOWN BOTH LEGS: ICD-10-CM

## 2025-05-29 DIAGNOSIS — M54.6 CHRONIC BILATERAL THORACIC BACK PAIN: ICD-10-CM

## 2025-05-29 PROCEDURE — 97014 ELECTRIC STIMULATION THERAPY: CPT | Mod: GP | Performed by: PHYSICAL THERAPIST

## 2025-05-29 PROCEDURE — 97140 MANUAL THERAPY 1/> REGIONS: CPT | Mod: GP | Performed by: PHYSICAL THERAPIST

## 2025-05-29 PROCEDURE — 97110 THERAPEUTIC EXERCISES: CPT | Mod: GP | Performed by: PHYSICAL THERAPIST

## 2025-05-29 NOTE — PROGRESS NOTES
"                                                                                                                         PHYSICAL THERAPY TREATMENT NOTE    Patient Name:  Josue Adam \"Estrada\"   Patient MRN: [id number]  Date: 5/29/2025  Time Calculation  Start Time: 0528  Stop Time: 0610  Time Calculation (min): 42 min      Problem List Items Addressed This Visit           ICD-10-CM    Lumbar pain with radiation down both legs M54.50, M79.604, M79.605    Chronic bilateral thoracic back pain M54.6, G89.29         Insurance:  Visit number: 12 of VISITS ARE MED NEC NO AUTH NEEDED   Insurance Type: Payor: [employer]M / Plan: [employer] / Product Type: *No Product type* /   Authorization or Plan of Care date Range: not required.    General:  Reason for visit: chronic LBP with LLE radicular tingling/pain.  Highly irritable LBP.  Xray + mild levo curvature of the lumbar spine.  Referred by: Farshad Seymour MD Next MD appt:  NA     Precautions:  IBS, chronic constipation, depressive disorder, ADHD,   Fall Risk: Moderate    Subjective:   Josue reports she feels like her condition is not improving. Patient reports helped with gardening on Sunday and pain increased that day to 10/10. Increases PPU to 9 per session. Pain has been decreasing a little each day with time. A little extra sore today after walking around a lot.     Pain (0-10): 5    HEP adherence / understanding: patient reports compliance with the instructed home exercises., increased PPU to 9 reps per set.     Assessment:   Education: Reviewed home exercise program. Educated to consider using lumbar roll at work and in the car to correct posture with lumbar extension in sitting. Educated on using tens at home for pain.    Progress towards functional goals: pt. Presented with moderate irritability today, decreased tolerance to exercise after increased LBP from gardening,   Response to interventions: moderate irritability today. Pain decreased to 3.5/10 with prone lie " "over pillow and tens unit applied. Patient tolerated therapeutic interventions well and without any adverse events.  Justification for continued skilled care: Modify and progress home exercise program. Reduce pain to improve function. Continue progressing and educating to allow pt. To manage pain independently at home.     Plan:  Continue therapeutic exercise for stabilization with manual therapy to relieve pain depending on pt. Response from previous session.    Objective:     Treatment Performed: (\"NP\" = Not Performed)     Therapeutic Exercise:     20 minutes  HEP: Access Code: [email or web address]  Nustep x  6 minutes L3, Speed .  Prone lie w/ elbow prop 20 sec holds  Tens applied for 10 min on LB with prone lie over pillow      **ACTIVITIES BELOW WERE NOT PERFORMED**   TKE Green x 15 with 5\" hold    Seated sciatic nerve stretching  Prone over 1 pillow with alternating KIM/prone lie  Prone knee bends: 3 x 10  ea LE  Prone PU: x 5   Row/LAE:  Black 2 x 15 ea  PALOFF walkouts: RED x 15   Chest Press: Green x 15   Wall sit: x 10, 5\" hold   Unilateral Leg press LLE: 25# 2 x 10 ea LE    Manual Therapy:     10 minutes  Prone lie Grade 1 and 2 mobilizations CPA L3/4/5;     **ACTIVITIES BELOW WERE NOT PERFORMED**   Dry Needling to the Bilateral lumbar paraspinals L4/5/S1, and L superior cluneal - TENS applied.  Prone PA L5 with PPU 3 x 5     Neuromuscular Re-education:      minutes      Modalities:       Vasopneumatic Device       minutes  Electrical Stimulation        10 minutes to left lower back.  Ultrasound            minutes  Iontophoresis                     minutes  Cold Pack            minutes  Mechanical Traction           minutes    Gait Training:            minutes      Aquatics:            minutes      Therapeutic Activity:      minutes      Self Care Home Management:    minutes    Canalith Reposition:          minutes     Education:          minutes    Other:       minutes      Evaluation Complexity: Low:   " minutes; Moderate   minutes; Complex   minutes    Re-Evaluation:   minutes

## 2025-06-05 ENCOUNTER — TREATMENT (OUTPATIENT)
Dept: PHYSICAL THERAPY | Facility: CLINIC | Age: 25
End: 2025-06-05
Payer: COMMERCIAL

## 2025-06-05 DIAGNOSIS — M79.604 LUMBAR PAIN WITH RADIATION DOWN BOTH LEGS: ICD-10-CM

## 2025-06-05 DIAGNOSIS — M79.605 LUMBAR PAIN WITH RADIATION DOWN BOTH LEGS: ICD-10-CM

## 2025-06-05 DIAGNOSIS — G89.29 CHRONIC BILATERAL THORACIC BACK PAIN: ICD-10-CM

## 2025-06-05 DIAGNOSIS — M54.6 CHRONIC BILATERAL THORACIC BACK PAIN: ICD-10-CM

## 2025-06-05 DIAGNOSIS — M54.50 LUMBAR PAIN WITH RADIATION DOWN BOTH LEGS: ICD-10-CM

## 2025-06-05 PROCEDURE — 97014 ELECTRIC STIMULATION THERAPY: CPT | Mod: GP | Performed by: PHYSICAL THERAPIST

## 2025-06-05 PROCEDURE — 97140 MANUAL THERAPY 1/> REGIONS: CPT | Mod: GP | Performed by: PHYSICAL THERAPIST

## 2025-06-05 PROCEDURE — 97110 THERAPEUTIC EXERCISES: CPT | Mod: GP | Performed by: PHYSICAL THERAPIST

## 2025-06-05 NOTE — PROGRESS NOTES
"                                                                                                                         PHYSICAL THERAPY TREATMENT NOTE    Patient Name:  Josue Adam \"Estrada\"   Patient MRN: [id number]  Date: 6/5/2025  Time Calculation  Start Time: 0517  Stop Time: 0600  Time Calculation (min): 43 min      Problem List Items Addressed This Visit           ICD-10-CM    Lumbar pain with radiation down both legs M54.50, M79.604, M79.605    Chronic bilateral thoracic back pain M54.6, G89.29           Insurance:  Visit number: 13 of VISITS ARE MED NEC NO AUTH NEEDED   Insurance Type: Payor: [employer]M / Plan: [employer] / Product Type: *No Product type* /   Authorization or Plan of Care date Range: not required.    General:  Reason for visit: chronic LBP with LLE radicular tingling/pain.  Highly irritable LBP.  Xray + mild levo curvature of the lumbar spine.  Referred by: Farshad Seymour MD Next MD appt:  NA     Precautions:  IBS, chronic constipation, depressive disorder, ADHD,   Fall Risk: Moderate    Subjective:   Josue reports she feels like her condition is worsening. Patient reports symptoms have worsened since last session, slight improvement after last session then symptoms worsened on Saturday with constant pain about 5-6/10 with 9/10 pain at night. Difficulty sleeping in every position. Tingling in leg constant all day. Weakness in L leg has increased. Reports ability to walk 5-10 minutes until pain increases. Symptoms of leg giving out.    Pain (0-10): 5    HEP adherence / understanding: patient reports compliance with the instructed home exercises.    Assessment:   Education: Reviewed home exercise program. Educated to talk with doctor about options for managing pain.   Progress towards functional goals: pt. Condition has worsened since last visit, increased pain and irritability.   Response to interventions: decrease tingle in the legs with prone lie. Pain decreased to 4.5 after session with " "pone lie and manual therapy.  Patient tolerated therapeutic interventions well and without any adverse events.  Justification for continued skilled care: Reduce pain to improve function. Education for home management of pain for completion of daily tasks. Therapeutic exercise for pain management and stability in the lumbar spine.     Plan:  Pain modulation for improved function or progressive therapeutic exercise depending on patient's symptoms.    Objective:    Treatment Performed: (\"NP\" = Not Performed)     Therapeutic Exercise:     20 minutes  HEP: Access Code: [email or web address]  Prone lie with one pillow 20 min  Tens applied for 10 min on LB with prone lie over pillow      **ACTIVITIES BELOW WERE NOT PERFORMED**   TKE Green x 15 with 5\" hold    Seated sciatic nerve stretching  Prone over 1 pillow with alternating KIM/prone lie  Prone knee bends: 3 x 10  ea LE  Prone PU: x 5   Row/LAE:  Black 2 x 15 ea  PALOFF walkouts: RED x 15   Chest Press: Green x 15   Wall sit: x 10, 5\" hold   Unilateral Leg press LLE: 25# 2 x 10 ea LE    Manual Therapy:     23 minutes  Prone lie Grade 1 and 2 mobilizations CPA L3/4/5;     **ACTIVITIES BELOW WERE NOT PERFORMED**   Dry Needling to the Bilateral lumbar paraspinals L4/5/S1, and L superior cluneal - TENS applied.  Prone PA L5 with PPU 3 x 5     Neuromuscular Re-education:      minutes      Modalities:       Vasopneumatic Device       minutes  Electrical Stimulation          minutes to left lower back.  Ultrasound            minutes  Iontophoresis                     minutes  Cold Pack            minutes  Mechanical Traction           minutes    Gait Training:            minutes      Aquatics:            minutes      Therapeutic Activity:      minutes      Self Care Home Management:    minutes    Canalith Reposition:          minutes     Education:          minutes    Other:       minutes      Evaluation Complexity: Low:   minutes; Moderate   minutes; Complex   minutes    Re-Evaluation:   " minutes

## 2025-06-06 ENCOUNTER — APPOINTMENT (OUTPATIENT)
Dept: PRIMARY CARE | Facility: CLINIC | Age: 25
End: 2025-06-06
Payer: COMMERCIAL

## 2025-06-06 VITALS
TEMPERATURE: 98.1 F | WEIGHT: 178.2 LBS | BODY MASS INDEX: 30.59 KG/M2 | SYSTOLIC BLOOD PRESSURE: 127 MMHG | HEART RATE: 97 BPM | DIASTOLIC BLOOD PRESSURE: 90 MMHG | OXYGEN SATURATION: 98 %

## 2025-06-06 DIAGNOSIS — M79.605 LUMBAR PAIN WITH RADIATION DOWN BOTH LEGS: Primary | ICD-10-CM

## 2025-06-06 DIAGNOSIS — M54.50 LUMBAR PAIN WITH RADIATION DOWN BOTH LEGS: Primary | ICD-10-CM

## 2025-06-06 DIAGNOSIS — M79.604 LUMBAR PAIN WITH RADIATION DOWN BOTH LEGS: Primary | ICD-10-CM

## 2025-06-06 DIAGNOSIS — R29.898 WEAKNESS OF LEFT LOWER EXTREMITY: ICD-10-CM

## 2025-06-06 PROBLEM — K58.9 IRRITABLE BOWEL SYNDROME: Status: ACTIVE | Noted: 2025-06-06

## 2025-06-06 PROBLEM — K58.9 IBS (IRRITABLE BOWEL SYNDROME): Status: RESOLVED | Noted: 2025-03-05 | Resolved: 2025-06-06

## 2025-06-06 PROCEDURE — 1036F TOBACCO NON-USER: CPT | Performed by: FAMILY MEDICINE

## 2025-06-06 PROCEDURE — 99214 OFFICE O/P EST MOD 30 MIN: CPT | Performed by: FAMILY MEDICINE

## 2025-06-06 RX ORDER — GABAPENTIN 300 MG/1
300 CAPSULE ORAL NIGHTLY
Qty: 90 CAPSULE | Refills: 0 | Status: SHIPPED | OUTPATIENT
Start: 2025-06-06 | End: 2025-09-04

## 2025-06-06 ASSESSMENT — PAIN SCALES - GENERAL: PAINLEVEL_OUTOF10: 6

## 2025-06-06 ASSESSMENT — PATIENT HEALTH QUESTIONNAIRE - PHQ9
2. FEELING DOWN, DEPRESSED OR HOPELESS: NOT AT ALL
SUM OF ALL RESPONSES TO PHQ9 QUESTIONS 1 AND 2: 0
1. LITTLE INTEREST OR PLEASURE IN DOING THINGS: NOT AT ALL

## 2025-06-06 NOTE — PROGRESS NOTES
Subjective   Patient ID: Estrada Adam is a 24 y.o. female who presents for 3 Month F/U and Breast Reduction.    HPI     Follow up low back pain with numbness and weakness left leg: completed PT   Fell when leg leg gave out     Review of Systems   All other systems reviewed and are negative.      Objective   /90 (BP Location: Left arm, Patient Position: Sitting, BP Cuff Size: Adult)   Pulse 97   Temp 36.7 °C (98.1 °F) (Temporal)   Wt 80.8 kg (178 lb 3.2 oz)   LMP 06/04/2025 (Exact Date)   SpO2 98%   BMI 30.59 kg/m²     Physical Exam  Vitals and nursing note reviewed.   Cardiovascular:      Rate and Rhythm: Normal rate and regular rhythm.   Pulmonary:      Effort: Pulmonary effort is normal.      Breath sounds: Normal breath sounds.   Musculoskeletal:      Cervical back: Neck supple.   Lymphadenopathy:      Cervical: No cervical adenopathy.   Neurological:      Mental Status: She is alert.         Assessment/Plan   Diagnoses and all orders for this visit:  Lumbar pain with radiation down both legs  -     MR lumbar spine wo IV contrast; Future  Weakness of left lower extremity  -     MR lumbar spine wo IV contrast; Future  Other orders  -     Follow Up In Primary Care - Established    Left leg weakness: no improvement with PT  ? Nerve compression.

## 2025-06-16 NOTE — PROGRESS NOTES
Entered in error.

## 2025-06-17 ENCOUNTER — DOCUMENTATION (OUTPATIENT)
Dept: PHYSICAL THERAPY | Facility: CLINIC | Age: 25
End: 2025-06-17
Payer: COMMERCIAL

## 2025-06-17 ENCOUNTER — TREATMENT (OUTPATIENT)
Dept: PHYSICAL THERAPY | Facility: CLINIC | Age: 25
End: 2025-06-17
Payer: COMMERCIAL

## 2025-06-17 DIAGNOSIS — G89.29 CHRONIC BILATERAL THORACIC BACK PAIN: ICD-10-CM

## 2025-06-17 DIAGNOSIS — M79.604 LUMBAR PAIN WITH RADIATION DOWN BOTH LEGS: Primary | ICD-10-CM

## 2025-06-17 DIAGNOSIS — M54.50 LUMBAR PAIN WITH RADIATION DOWN BOTH LEGS: Primary | ICD-10-CM

## 2025-06-17 DIAGNOSIS — M54.6 CHRONIC BILATERAL THORACIC BACK PAIN: ICD-10-CM

## 2025-06-17 DIAGNOSIS — M79.605 LUMBAR PAIN WITH RADIATION DOWN BOTH LEGS: Primary | ICD-10-CM

## 2025-06-26 ENCOUNTER — TREATMENT (OUTPATIENT)
Dept: PHYSICAL THERAPY | Facility: CLINIC | Age: 25
End: 2025-06-26
Payer: COMMERCIAL

## 2025-06-26 DIAGNOSIS — M54.50 LUMBAR PAIN WITH RADIATION DOWN BOTH LEGS: ICD-10-CM

## 2025-06-26 DIAGNOSIS — M79.605 LUMBAR PAIN WITH RADIATION DOWN BOTH LEGS: ICD-10-CM

## 2025-06-26 DIAGNOSIS — M79.604 LUMBAR PAIN WITH RADIATION DOWN BOTH LEGS: ICD-10-CM

## 2025-06-26 DIAGNOSIS — M54.6 CHRONIC BILATERAL THORACIC BACK PAIN: ICD-10-CM

## 2025-06-26 DIAGNOSIS — G89.29 CHRONIC BILATERAL THORACIC BACK PAIN: ICD-10-CM

## 2025-06-26 PROCEDURE — 97140 MANUAL THERAPY 1/> REGIONS: CPT | Mod: GP | Performed by: PHYSICAL THERAPIST

## 2025-06-26 PROCEDURE — 97110 THERAPEUTIC EXERCISES: CPT | Mod: GP | Performed by: PHYSICAL THERAPIST

## 2025-06-26 NOTE — PROGRESS NOTES
"                                                                                                                         PHYSICAL THERAPY TREATMENT NOTE    Patient Name:  Josue Adam \"Estrada\"   Patient MRN: [id number]  Date: 6/26/2025  Time Calculation  Start Time: 0530  Stop Time: 0610  Time Calculation (min): 40 min      Problem List Items Addressed This Visit           ICD-10-CM    Lumbar pain with radiation down both legs M54.50, M79.604, M79.605    Chronic bilateral thoracic back pain M54.6, G89.29       Insurance:  Visit number: 14 of VISITS ARE MED NEC NO AUTH NEEDED   Insurance Type: Payor: ANTHEM / Plan: [employer] / Product Type: *No Product type* /   Authorization or Plan of Care date Range: not required.    General:  Reason for visit: chronic LBP with LLE radicular tingling/pain.  Highly irritable LBP.  Xray + mild levo curvature of the lumbar spine.  Referred by: Farshad Seymour MD Next MD appt:  NA     Precautions:  IBS, chronic constipation, depressive disorder, ADHD,   Fall Risk: Moderate    Subjective:   Reports intermittent numbness in the LLE and LLE still gives out.  Walking limited to 5-10 minutes due to LBP Patient reports Patient reports there has not been a significant change in functional abilities.   Pain (0-10):  2-3/10 till about 3 pm then up to 5/10 pain - relates this to taking Gabapentin in the evenings prior to bed.     HEP adherence / understanding: patient reports compliance with the instructed home exercises.    Assessment:   Education: Reviewed home exercise program.  Progress towards functional goals: Patient reports there has not been a significant change in functional abilities. KIM: 3/10.    Cannot fully extend spine.  Cannot stand upright.  Response to interventions: Poor tolerance to exercise and manual therapy.  Unable to extend lumbar spine > neutral due to severe LBP with radiation into the LLE  Justification for continued skilled care: To address remaining functional, " "objective and subjective deficits to allow the patient to return to full independence with ADLs.    Plan:  Hold on PT.  MRI on Saturday.    Objective:       Treatment Performed: (\"NP\" = Not Performed)     Therapeutic Exercise:     20 minutes  HEP: Access Code: [email or web address]  Prone lie <>KIM  >  PPU - no relief - symptoms worsened.  Severe LBP  Seated repeated flexion: 1/10.  Standing lumbar extension.      **ACTIVITIES BELOW WERE NOT PERFORMED**   TKE Green x 15 with 5\" hold  Seated sciatic nerve stretching  Prone over 1 pillow with alternating KIM/prone lie  Prone knee bends: 3 x 10  ea LE  Prone PU: x 5   Row/LAE:  Black 2 x 15 ea  PALOFF walkouts: RED x 15   Chest Press: Green x 15   Wall sit: x 10, 5\" hold   Unilateral Leg press LLE: 25# 2 x 10 ea LE    Manual Therapy:     20 minutes  Prone lie Grade 1 and 2 mobilizations CPA L3/4/5;     **ACTIVITIES BELOW WERE NOT PERFORMED**   Dry Needling to the Bilateral lumbar paraspinals L4/5/S1, and L superior cluneal - TENS applied.  Prone PA L5 with PPU 3 x 5     Neuromuscular Re-education:      minutes      Modalities:       Vasopneumatic Device       minutes  Electrical Stimulation          minutes to left lower back.  Ultrasound            minutes  Iontophoresis                     minutes  Cold Pack            minutes  Mechanical Traction           minutes    Gait Training:            minutes      Aquatics:            minutes      Therapeutic Activity:      minutes      Self Care Home Management:    minutes    Canalith Reposition:          minutes     Education:          minutes    Other:       minutes      Evaluation Complexity: Low:   minutes; Moderate   minutes; Complex   minutes    Re-Evaluation:   minutes                   "

## 2025-06-28 ENCOUNTER — APPOINTMENT (OUTPATIENT)
Dept: RADIOLOGY | Facility: HOSPITAL | Age: 25
End: 2025-06-28
Payer: COMMERCIAL

## 2025-06-28 DIAGNOSIS — M79.605 LUMBAR PAIN WITH RADIATION DOWN BOTH LEGS: ICD-10-CM

## 2025-06-28 DIAGNOSIS — R29.898 WEAKNESS OF LEFT LOWER EXTREMITY: ICD-10-CM

## 2025-06-28 DIAGNOSIS — M79.604 LUMBAR PAIN WITH RADIATION DOWN BOTH LEGS: ICD-10-CM

## 2025-06-28 DIAGNOSIS — M54.50 LUMBAR PAIN WITH RADIATION DOWN BOTH LEGS: ICD-10-CM

## 2025-06-28 PROCEDURE — 72148 MRI LUMBAR SPINE W/O DYE: CPT

## 2025-06-28 PROCEDURE — 72148 MRI LUMBAR SPINE W/O DYE: CPT | Performed by: RADIOLOGY

## 2025-07-07 ENCOUNTER — PATIENT MESSAGE (OUTPATIENT)
Dept: PRIMARY CARE | Facility: CLINIC | Age: 25
End: 2025-07-07
Payer: COMMERCIAL

## 2025-07-07 DIAGNOSIS — M54.50 LUMBAR PAIN WITH RADIATION DOWN BOTH LEGS: Primary | ICD-10-CM

## 2025-07-07 DIAGNOSIS — M79.605 LUMBAR PAIN WITH RADIATION DOWN BOTH LEGS: Primary | ICD-10-CM

## 2025-07-07 DIAGNOSIS — M79.604 LUMBAR PAIN WITH RADIATION DOWN BOTH LEGS: Primary | ICD-10-CM

## 2025-07-07 DIAGNOSIS — M51.16 LUMBAR DISC HERNIATION WITH RADICULOPATHY: ICD-10-CM

## 2025-08-01 LAB — NON-UH HIE PLATELET FUNCTION (ASPIRIN): 523 ARU

## 2025-08-05 ENCOUNTER — DOCUMENTATION WITH CHARGES (OUTPATIENT)
Dept: PHYSICAL THERAPY | Facility: CLINIC | Age: 25
End: 2025-08-05
Payer: COMMERCIAL

## 2025-08-06 LAB — NON-UH HIE PLATELET FUNCTION (ASPIRIN): 649 ARU

## 2025-08-07 LAB
NON-UH HIE APPEARANCE, U: ABNORMAL
NON-UH HIE APTT PATIENT: 31.5 SECONDS (ref 26–36)
NON-UH HIE BACTERIA, U: ABNORMAL
NON-UH HIE BASO COUNT: 0.07 X1000 (ref 0–0.2)
NON-UH HIE BASOS %: 0.8 %
NON-UH HIE BILIRUBIN, U: ABNORMAL
NON-UH HIE BLOOD, U: ABNORMAL
NON-UH HIE BUN/CREAT RATIO: 12.9
NON-UH HIE BUN: 9 MG/DL (ref 9–23)
NON-UH HIE CALCIUM: 9.8 MG/DL (ref 8.7–10.4)
NON-UH HIE CALCULATED OSMOLALITY: 277 MOSM/KG (ref 275–295)
NON-UH HIE CHLORIDE: 107 MMOL/L (ref 98–107)
NON-UH HIE CO2, VENOUS: 26 MMOL/L (ref 20–31)
NON-UH HIE COLOR, U: ABNORMAL
NON-UH HIE CREATININE: 0.7 MG/DL (ref 0.5–0.8)
NON-UH HIE DIFF?: NORMAL
NON-UH HIE EOS COUNT: 0.07 X1000 (ref 0–0.5)
NON-UH HIE EOSIN %: 0.8 %
NON-UH HIE GFR AA: >60
NON-UH HIE GLOMERULAR FILTRATION RATE: >60 ML/MIN/1.73M?
NON-UH HIE GLUCOSE QUAL, U: ABNORMAL
NON-UH HIE GLUCOSE: 83 MG/DL (ref 74–106)
NON-UH HIE HCT: 45.3 % (ref 36–46)
NON-UH HIE HGB: 15.5 G/DL (ref 12–16)
NON-UH HIE INR: 1
NON-UH HIE INSTR WBC: 8.9
NON-UH HIE K: 3.8 MMOL/L (ref 3.5–5.1)
NON-UH HIE KETONES, U: ABNORMAL
NON-UH HIE LEUKOCYTE ESTERASE, U: ABNORMAL
NON-UH HIE LYMPH %: 28.7 %
NON-UH HIE LYMPH COUNT: 2.54 X1000 (ref 1.2–4.8)
NON-UH HIE MCH: 30.4 PG (ref 27–34)
NON-UH HIE MCHC: 34.2 G/DL (ref 32–37)
NON-UH HIE MCV: 88.8 FL (ref 80–100)
NON-UH HIE MONO %: 5.3 %
NON-UH HIE MONO COUNT: 0.47 X1000 (ref 0.1–1)
NON-UH HIE MPV: 7.6 FL (ref 7.4–10.4)
NON-UH HIE MUCOUS, U: ABNORMAL
NON-UH HIE NA: 140 MMOL/L (ref 135–145)
NON-UH HIE NEUTROPHIL %: 64.4 %
NON-UH HIE NEUTROPHIL COUNT (ANC): 5.7 X1000 (ref 1.4–8.8)
NON-UH HIE NITRITE, U: ABNORMAL
NON-UH HIE NUCLEATED RBC: 0 /100WBC
NON-UH HIE PH, U: 6 (ref 4.5–8)
NON-UH HIE PLATELET: 352 X10 (ref 150–450)
NON-UH HIE PROTEIN, U: ABNORMAL
NON-UH HIE PROTIME PATIENT: 11.6 SECONDS (ref 9.8–12.4)
NON-UH HIE RBC/HPF, U: 1 #/HPF (ref 0–3)
NON-UH HIE RBC: 5.11 X10 (ref 4.2–5.4)
NON-UH HIE RDW: 12.9 % (ref 11.5–14.5)
NON-UH HIE SPECIFIC GRAVITY, U: 1.02 (ref 1–1.03)
NON-UH HIE SQUAMOUS EPITHELIAL CELLS, U: 4 #/HPF
NON-UH HIE U MICRO: ABNORMAL
NON-UH HIE UROBILINOGEN QUAL, U: ABNORMAL
NON-UH HIE WBC/HPF, U: 6 #/HPF (ref 0–5)
NON-UH HIE WBC: 8.9 X10 (ref 4.5–11)

## 2025-08-14 ENCOUNTER — ANESTHESIA EVENT (OUTPATIENT)
Dept: OPERATING ROOM | Age: 25
End: 2025-08-14
Payer: COMMERCIAL

## 2025-08-15 ENCOUNTER — APPOINTMENT (OUTPATIENT)
Dept: GENERAL RADIOLOGY | Age: 25
End: 2025-08-15
Attending: NEUROLOGICAL SURGERY
Payer: COMMERCIAL

## 2025-08-15 ENCOUNTER — ANESTHESIA (OUTPATIENT)
Dept: OPERATING ROOM | Age: 25
End: 2025-08-15
Payer: COMMERCIAL

## 2025-08-15 ENCOUNTER — HOSPITAL ENCOUNTER (OUTPATIENT)
Age: 25
Setting detail: OBSERVATION
Discharge: HOME | End: 2025-08-16
Attending: NEUROLOGICAL SURGERY | Admitting: NEUROLOGICAL SURGERY
Payer: COMMERCIAL

## 2025-08-15 DIAGNOSIS — M48.07 SPINAL STENOSIS OF LUMBOSACRAL REGION: Primary | ICD-10-CM

## 2025-08-15 DIAGNOSIS — M51.26 LUMBAR DISC HERNIATION: ICD-10-CM

## 2025-08-15 PROBLEM — M48.00 SPINAL STENOSIS: Status: ACTIVE | Noted: 2025-08-15

## 2025-08-15 LAB
ABO/RH: NORMAL
ANTIBODY SCREEN: NORMAL
CREAT SERPL-MCNC: 0.68 MG/DL (ref 0.5–0.9)
HCG, URINE, POC: NEGATIVE
Lab: NORMAL
NEGATIVE QC PASS/FAIL: NORMAL
POSITIVE QC PASS/FAIL: NORMAL

## 2025-08-15 PROCEDURE — 82565 ASSAY OF CREATININE: CPT

## 2025-08-15 PROCEDURE — 6370000000 HC RX 637 (ALT 250 FOR IP): Performed by: ANESTHESIOLOGY

## 2025-08-15 PROCEDURE — 7100000000 HC PACU RECOVERY - FIRST 15 MIN: Performed by: NEUROLOGICAL SURGERY

## 2025-08-15 PROCEDURE — 6360000002 HC RX W HCPCS: Performed by: NEUROLOGICAL SURGERY

## 2025-08-15 PROCEDURE — 86900 BLOOD TYPING SEROLOGIC ABO: CPT

## 2025-08-15 PROCEDURE — 6360000002 HC RX W HCPCS: Performed by: NURSE ANESTHETIST, CERTIFIED REGISTERED

## 2025-08-15 PROCEDURE — 6360000002 HC RX W HCPCS: Performed by: ANESTHESIOLOGY

## 2025-08-15 PROCEDURE — 2500000003 HC RX 250 WO HCPCS: Performed by: NEUROLOGICAL SURGERY

## 2025-08-15 PROCEDURE — 86901 BLOOD TYPING SEROLOGIC RH(D): CPT

## 2025-08-15 PROCEDURE — 3700000000 HC ANESTHESIA ATTENDED CARE: Performed by: NEUROLOGICAL SURGERY

## 2025-08-15 PROCEDURE — G0378 HOSPITAL OBSERVATION PER HR: HCPCS

## 2025-08-15 PROCEDURE — 2580000003 HC RX 258: Performed by: ANESTHESIOLOGY

## 2025-08-15 PROCEDURE — 3600000004 HC SURGERY LEVEL 4 BASE: Performed by: NEUROLOGICAL SURGERY

## 2025-08-15 PROCEDURE — 88304 TISSUE EXAM BY PATHOLOGIST: CPT

## 2025-08-15 PROCEDURE — 2720000010 HC SURG SUPPLY STERILE: Performed by: NEUROLOGICAL SURGERY

## 2025-08-15 PROCEDURE — 7100000001 HC PACU RECOVERY - ADDTL 15 MIN: Performed by: NEUROLOGICAL SURGERY

## 2025-08-15 PROCEDURE — 6370000000 HC RX 637 (ALT 250 FOR IP): Performed by: NEUROLOGICAL SURGERY

## 2025-08-15 PROCEDURE — 2500000003 HC RX 250 WO HCPCS: Performed by: NURSE ANESTHETIST, CERTIFIED REGISTERED

## 2025-08-15 PROCEDURE — 36415 COLL VENOUS BLD VENIPUNCTURE: CPT

## 2025-08-15 PROCEDURE — 3600000014 HC SURGERY LEVEL 4 ADDTL 15MIN: Performed by: NEUROLOGICAL SURGERY

## 2025-08-15 PROCEDURE — 2709999900 HC NON-CHARGEABLE SUPPLY: Performed by: NEUROLOGICAL SURGERY

## 2025-08-15 PROCEDURE — 86850 RBC ANTIBODY SCREEN: CPT

## 2025-08-15 PROCEDURE — 94150 VITAL CAPACITY TEST: CPT

## 2025-08-15 PROCEDURE — C1713 ANCHOR/SCREW BN/BN,TIS/BN: HCPCS | Performed by: NEUROLOGICAL SURGERY

## 2025-08-15 PROCEDURE — 3700000001 HC ADD 15 MINUTES (ANESTHESIA): Performed by: NEUROLOGICAL SURGERY

## 2025-08-15 RX ORDER — MIDAZOLAM HYDROCHLORIDE 1 MG/ML
INJECTION, SOLUTION INTRAMUSCULAR; INTRAVENOUS
Status: DISCONTINUED | OUTPATIENT
Start: 2025-08-15 | End: 2025-08-15 | Stop reason: SDUPTHER

## 2025-08-15 RX ORDER — SODIUM CHLORIDE AND POTASSIUM CHLORIDE 150; 900 MG/100ML; MG/100ML
INJECTION, SOLUTION INTRAVENOUS CONTINUOUS
Status: DISCONTINUED | OUTPATIENT
Start: 2025-08-15 | End: 2025-08-16

## 2025-08-15 RX ORDER — CYCLOBENZAPRINE HCL 10 MG
10 TABLET ORAL 3 TIMES DAILY PRN
Qty: 60 TABLET | Refills: 0 | Status: SHIPPED | OUTPATIENT
Start: 2025-08-15 | End: 2025-09-04

## 2025-08-15 RX ORDER — LIDOCAINE HYDROCHLORIDE 10 MG/ML
1 INJECTION, SOLUTION EPIDURAL; INFILTRATION; INTRACAUDAL; PERINEURAL
Status: DISCONTINUED | OUTPATIENT
Start: 2025-08-15 | End: 2025-08-15 | Stop reason: HOSPADM

## 2025-08-15 RX ORDER — ONDANSETRON 2 MG/ML
4 INJECTION INTRAMUSCULAR; INTRAVENOUS EVERY 6 HOURS PRN
Status: DISCONTINUED | OUTPATIENT
Start: 2025-08-15 | End: 2025-08-16 | Stop reason: HOSPADM

## 2025-08-15 RX ORDER — DEXTROSE MONOHYDRATE 100 MG/ML
INJECTION, SOLUTION INTRAVENOUS CONTINUOUS PRN
Status: DISCONTINUED | OUTPATIENT
Start: 2025-08-15 | End: 2025-08-15 | Stop reason: HOSPADM

## 2025-08-15 RX ORDER — IPRATROPIUM BROMIDE AND ALBUTEROL SULFATE 2.5; .5 MG/3ML; MG/3ML
1 SOLUTION RESPIRATORY (INHALATION)
Status: DISCONTINUED | OUTPATIENT
Start: 2025-08-15 | End: 2025-08-15 | Stop reason: HOSPADM

## 2025-08-15 RX ORDER — OXYCODONE AND ACETAMINOPHEN 5; 325 MG/1; MG/1
1 TABLET ORAL EVERY 8 HOURS PRN
Qty: 21 TABLET | Refills: 0 | Status: SHIPPED | OUTPATIENT
Start: 2025-08-15 | End: 2025-08-22

## 2025-08-15 RX ORDER — OXYCODONE HYDROCHLORIDE 5 MG/1
5 TABLET ORAL EVERY 4 HOURS PRN
Status: DISCONTINUED | OUTPATIENT
Start: 2025-08-15 | End: 2025-08-16 | Stop reason: HOSPADM

## 2025-08-15 RX ORDER — SODIUM CHLORIDE 0.9 % (FLUSH) 0.9 %
5-40 SYRINGE (ML) INJECTION PRN
Status: DISCONTINUED | OUTPATIENT
Start: 2025-08-15 | End: 2025-08-16 | Stop reason: HOSPADM

## 2025-08-15 RX ORDER — BISACODYL 10 MG
10 SUPPOSITORY, RECTAL RECTAL DAILY PRN
Status: DISCONTINUED | OUTPATIENT
Start: 2025-08-15 | End: 2025-08-16 | Stop reason: HOSPADM

## 2025-08-15 RX ORDER — FENTANYL CITRATE 50 UG/ML
INJECTION, SOLUTION INTRAMUSCULAR; INTRAVENOUS
Status: DISCONTINUED | OUTPATIENT
Start: 2025-08-15 | End: 2025-08-15 | Stop reason: SDUPTHER

## 2025-08-15 RX ORDER — SCOPOLAMINE 1 MG/3D
1 PATCH, EXTENDED RELEASE TRANSDERMAL
Status: DISCONTINUED | OUTPATIENT
Start: 2025-08-15 | End: 2025-08-15 | Stop reason: HOSPADM

## 2025-08-15 RX ORDER — CYCLOBENZAPRINE HCL 10 MG
10 TABLET ORAL EVERY 12 HOURS PRN
Status: DISCONTINUED | OUTPATIENT
Start: 2025-08-15 | End: 2025-08-16 | Stop reason: HOSPADM

## 2025-08-15 RX ORDER — GLUCAGON 1 MG/ML
1 KIT INJECTION PRN
Status: DISCONTINUED | OUTPATIENT
Start: 2025-08-15 | End: 2025-08-15 | Stop reason: HOSPADM

## 2025-08-15 RX ORDER — MORPHINE SULFATE 1 MG/ML
INJECTION, SOLUTION EPIDURAL; INTRATHECAL; INTRAVENOUS PRN
Status: DISCONTINUED | OUTPATIENT
Start: 2025-08-15 | End: 2025-08-15 | Stop reason: HOSPADM

## 2025-08-15 RX ORDER — ACETAMINOPHEN 325 MG/1
650 TABLET ORAL EVERY 6 HOURS
Status: DISCONTINUED | OUTPATIENT
Start: 2025-08-15 | End: 2025-08-16 | Stop reason: HOSPADM

## 2025-08-15 RX ORDER — METHYLPREDNISOLONE ACETATE 40 MG/ML
INJECTION, SUSPENSION INTRA-ARTICULAR; INTRALESIONAL; INTRAMUSCULAR; SOFT TISSUE PRN
Status: DISCONTINUED | OUTPATIENT
Start: 2025-08-15 | End: 2025-08-15 | Stop reason: HOSPADM

## 2025-08-15 RX ORDER — METOCLOPRAMIDE HYDROCHLORIDE 5 MG/ML
10 INJECTION INTRAMUSCULAR; INTRAVENOUS
Status: DISCONTINUED | OUTPATIENT
Start: 2025-08-15 | End: 2025-08-15 | Stop reason: HOSPADM

## 2025-08-15 RX ORDER — ONDANSETRON 4 MG/1
4 TABLET, ORALLY DISINTEGRATING ORAL EVERY 8 HOURS PRN
Status: DISCONTINUED | OUTPATIENT
Start: 2025-08-15 | End: 2025-08-16 | Stop reason: HOSPADM

## 2025-08-15 RX ORDER — BUPIVACAINE HYDROCHLORIDE 5 MG/ML
INJECTION, SOLUTION EPIDURAL; INTRACAUDAL; PERINEURAL PRN
Status: DISCONTINUED | OUTPATIENT
Start: 2025-08-15 | End: 2025-08-15 | Stop reason: HOSPADM

## 2025-08-15 RX ORDER — MAGNESIUM HYDROXIDE 1200 MG/15ML
LIQUID ORAL CONTINUOUS PRN
Status: DISCONTINUED | OUTPATIENT
Start: 2025-08-15 | End: 2025-08-15 | Stop reason: HOSPADM

## 2025-08-15 RX ORDER — ONDANSETRON 2 MG/ML
4 INJECTION INTRAMUSCULAR; INTRAVENOUS
Status: DISCONTINUED | OUTPATIENT
Start: 2025-08-15 | End: 2025-08-15 | Stop reason: HOSPADM

## 2025-08-15 RX ORDER — METOPROLOL TARTRATE 1 MG/ML
INJECTION, SOLUTION INTRAVENOUS
Status: DISCONTINUED | OUTPATIENT
Start: 2025-08-15 | End: 2025-08-15 | Stop reason: SDUPTHER

## 2025-08-15 RX ORDER — LABETALOL HYDROCHLORIDE 5 MG/ML
10 INJECTION, SOLUTION INTRAVENOUS
Status: DISCONTINUED | OUTPATIENT
Start: 2025-08-15 | End: 2025-08-15 | Stop reason: HOSPADM

## 2025-08-15 RX ORDER — DEXAMETHASONE SODIUM PHOSPHATE 4 MG/ML
INJECTION, SOLUTION INTRA-ARTICULAR; INTRALESIONAL; INTRAMUSCULAR; INTRAVENOUS; SOFT TISSUE
Status: DISCONTINUED | OUTPATIENT
Start: 2025-08-15 | End: 2025-08-15 | Stop reason: SDUPTHER

## 2025-08-15 RX ORDER — SODIUM CHLORIDE 9 MG/ML
INJECTION, SOLUTION INTRAVENOUS PRN
Status: DISCONTINUED | OUTPATIENT
Start: 2025-08-15 | End: 2025-08-16 | Stop reason: HOSPADM

## 2025-08-15 RX ORDER — PROPOFOL 10 MG/ML
INJECTION, EMULSION INTRAVENOUS
Status: DISCONTINUED | OUTPATIENT
Start: 2025-08-15 | End: 2025-08-15 | Stop reason: SDUPTHER

## 2025-08-15 RX ORDER — SODIUM CHLORIDE 0.9 % (FLUSH) 0.9 %
5-40 SYRINGE (ML) INJECTION EVERY 12 HOURS SCHEDULED
Status: DISCONTINUED | OUTPATIENT
Start: 2025-08-15 | End: 2025-08-15 | Stop reason: HOSPADM

## 2025-08-15 RX ORDER — MEPERIDINE HYDROCHLORIDE 25 MG/ML
12.5 INJECTION INTRAMUSCULAR; INTRAVENOUS; SUBCUTANEOUS EVERY 5 MIN PRN
Status: DISCONTINUED | OUTPATIENT
Start: 2025-08-15 | End: 2025-08-15 | Stop reason: HOSPADM

## 2025-08-15 RX ORDER — SODIUM CHLORIDE 9 MG/ML
INJECTION, SOLUTION INTRAVENOUS PRN
Status: DISCONTINUED | OUTPATIENT
Start: 2025-08-15 | End: 2025-08-15 | Stop reason: HOSPADM

## 2025-08-15 RX ORDER — ENOXAPARIN SODIUM 100 MG/ML
30 INJECTION SUBCUTANEOUS DAILY
Status: DISCONTINUED | OUTPATIENT
Start: 2025-08-16 | End: 2025-08-16 | Stop reason: HOSPADM

## 2025-08-15 RX ORDER — GABAPENTIN 300 MG/1
300 CAPSULE ORAL NIGHTLY
Status: DISCONTINUED | OUTPATIENT
Start: 2025-08-15 | End: 2025-08-16 | Stop reason: HOSPADM

## 2025-08-15 RX ORDER — SODIUM CHLORIDE 0.9 % (FLUSH) 0.9 %
5-40 SYRINGE (ML) INJECTION EVERY 12 HOURS SCHEDULED
Status: DISCONTINUED | OUTPATIENT
Start: 2025-08-15 | End: 2025-08-16 | Stop reason: HOSPADM

## 2025-08-15 RX ORDER — LEVONORGESTREL AND ETHINYL ESTRADIOL 0.15-0.03
1 KIT ORAL DAILY
COMMUNITY
Start: 2025-04-15

## 2025-08-15 RX ORDER — FENTANYL CITRATE 0.05 MG/ML
25 INJECTION, SOLUTION INTRAMUSCULAR; INTRAVENOUS EVERY 5 MIN PRN
Status: DISCONTINUED | OUTPATIENT
Start: 2025-08-15 | End: 2025-08-15 | Stop reason: HOSPADM

## 2025-08-15 RX ORDER — LIDOCAINE HYDROCHLORIDE 20 MG/ML
INJECTION, SOLUTION INFILTRATION; PERINEURAL
Status: DISCONTINUED | OUTPATIENT
Start: 2025-08-15 | End: 2025-08-15 | Stop reason: SDUPTHER

## 2025-08-15 RX ORDER — SENNA AND DOCUSATE SODIUM 50; 8.6 MG/1; MG/1
1 TABLET, FILM COATED ORAL 2 TIMES DAILY
Status: DISCONTINUED | OUTPATIENT
Start: 2025-08-15 | End: 2025-08-16 | Stop reason: HOSPADM

## 2025-08-15 RX ORDER — GINSENG 100 MG
CAPSULE ORAL PRN
Status: DISCONTINUED | OUTPATIENT
Start: 2025-08-15 | End: 2025-08-15 | Stop reason: HOSPADM

## 2025-08-15 RX ORDER — HYDRALAZINE HYDROCHLORIDE 20 MG/ML
10 INJECTION INTRAMUSCULAR; INTRAVENOUS
Status: DISCONTINUED | OUTPATIENT
Start: 2025-08-15 | End: 2025-08-15 | Stop reason: HOSPADM

## 2025-08-15 RX ORDER — SODIUM CHLORIDE, SODIUM LACTATE, POTASSIUM CHLORIDE, CALCIUM CHLORIDE 600; 310; 30; 20 MG/100ML; MG/100ML; MG/100ML; MG/100ML
INJECTION, SOLUTION INTRAVENOUS CONTINUOUS
Status: DISCONTINUED | OUTPATIENT
Start: 2025-08-15 | End: 2025-08-15 | Stop reason: HOSPADM

## 2025-08-15 RX ORDER — GABAPENTIN 300 MG/1
300 CAPSULE ORAL NIGHTLY
COMMUNITY
Start: 2025-06-06 | End: 2025-09-04

## 2025-08-15 RX ORDER — VANCOMYCIN HYDROCHLORIDE 1 G/20ML
INJECTION, POWDER, LYOPHILIZED, FOR SOLUTION INTRAVENOUS PRN
Status: DISCONTINUED | OUTPATIENT
Start: 2025-08-15 | End: 2025-08-15 | Stop reason: HOSPADM

## 2025-08-15 RX ORDER — LEVONORGESTREL AND ETHINYL ESTRADIOL 0.15-0.03
1 KIT ORAL DAILY
Status: DISCONTINUED | OUTPATIENT
Start: 2025-08-15 | End: 2025-08-16 | Stop reason: HOSPADM

## 2025-08-15 RX ORDER — ROCURONIUM BROMIDE 10 MG/ML
INJECTION, SOLUTION INTRAVENOUS
Status: DISCONTINUED | OUTPATIENT
Start: 2025-08-15 | End: 2025-08-15 | Stop reason: SDUPTHER

## 2025-08-15 RX ORDER — ONDANSETRON 2 MG/ML
INJECTION INTRAMUSCULAR; INTRAVENOUS
Status: DISCONTINUED | OUTPATIENT
Start: 2025-08-15 | End: 2025-08-15 | Stop reason: SDUPTHER

## 2025-08-15 RX ORDER — OXYCODONE HYDROCHLORIDE 5 MG/1
10 TABLET ORAL PRN
Status: DISCONTINUED | OUTPATIENT
Start: 2025-08-15 | End: 2025-08-15 | Stop reason: HOSPADM

## 2025-08-15 RX ORDER — HYDROMORPHONE HYDROCHLORIDE 1 MG/ML
1 INJECTION, SOLUTION INTRAMUSCULAR; INTRAVENOUS; SUBCUTANEOUS
Status: DISCONTINUED | OUTPATIENT
Start: 2025-08-15 | End: 2025-08-16 | Stop reason: HOSPADM

## 2025-08-15 RX ORDER — OXYCODONE HYDROCHLORIDE 5 MG/1
5 TABLET ORAL PRN
Status: DISCONTINUED | OUTPATIENT
Start: 2025-08-15 | End: 2025-08-15 | Stop reason: HOSPADM

## 2025-08-15 RX ORDER — SODIUM CHLORIDE 0.9 % (FLUSH) 0.9 %
5-40 SYRINGE (ML) INJECTION PRN
Status: DISCONTINUED | OUTPATIENT
Start: 2025-08-15 | End: 2025-08-15 | Stop reason: HOSPADM

## 2025-08-15 RX ADMIN — SODIUM CHLORIDE, SODIUM LACTATE, POTASSIUM CHLORIDE, AND CALCIUM CHLORIDE: .6; .31; .03; .02 INJECTION, SOLUTION INTRAVENOUS at 08:26

## 2025-08-15 RX ADMIN — FENTANYL CITRATE 100 MCG: 50 INJECTION, SOLUTION INTRAMUSCULAR; INTRAVENOUS at 07:40

## 2025-08-15 RX ADMIN — OXYCODONE 5 MG: 5 TABLET ORAL at 13:32

## 2025-08-15 RX ADMIN — METOPROLOL TARTRATE 2.5 MG: 1 INJECTION, SOLUTION INTRAVENOUS at 08:49

## 2025-08-15 RX ADMIN — SENNOSIDES AND DOCUSATE SODIUM 1 TABLET: 8.6; 5 TABLET ORAL at 22:06

## 2025-08-15 RX ADMIN — SODIUM CHLORIDE, PRESERVATIVE FREE 10 ML: 5 INJECTION INTRAVENOUS at 22:17

## 2025-08-15 RX ADMIN — WATER 2000 MG: 1 INJECTION INTRAMUSCULAR; INTRAVENOUS; SUBCUTANEOUS at 15:55

## 2025-08-15 RX ADMIN — ACETAMINOPHEN 650 MG: 325 TABLET ORAL at 22:04

## 2025-08-15 RX ADMIN — CEFAZOLIN 2000 MG: 2 INJECTION, POWDER, FOR SOLUTION INTRAMUSCULAR; INTRAVENOUS at 07:47

## 2025-08-15 RX ADMIN — LIDOCAINE HYDROCHLORIDE 40 MG: 20 INJECTION, SOLUTION EPIDURAL; INFILTRATION; INTRACAUDAL; PERINEURAL at 07:40

## 2025-08-15 RX ADMIN — PROPOFOL 30 MG: 10 INJECTION, EMULSION INTRAVENOUS at 09:36

## 2025-08-15 RX ADMIN — FENTANYL CITRATE 25 MCG: 0.05 INJECTION, SOLUTION INTRAMUSCULAR; INTRAVENOUS at 10:23

## 2025-08-15 RX ADMIN — SUGAMMADEX 200 MG: 100 INJECTION, SOLUTION INTRAVENOUS at 09:41

## 2025-08-15 RX ADMIN — MIDAZOLAM HYDROCHLORIDE 2 MG: 1 INJECTION, SOLUTION INTRAMUSCULAR; INTRAVENOUS at 07:32

## 2025-08-15 RX ADMIN — ONDANSETRON 4 MG: 2 INJECTION, SOLUTION INTRAMUSCULAR; INTRAVENOUS at 07:47

## 2025-08-15 RX ADMIN — PROPOFOL 150 MG: 10 INJECTION, EMULSION INTRAVENOUS at 07:40

## 2025-08-15 RX ADMIN — DEXAMETHASONE SODIUM PHOSPHATE 10 MG: 4 INJECTION, SOLUTION INTRAMUSCULAR; INTRAVENOUS at 07:47

## 2025-08-15 RX ADMIN — OXYCODONE 5 MG: 5 TABLET ORAL at 22:11

## 2025-08-15 RX ADMIN — ACETAMINOPHEN 650 MG: 325 TABLET ORAL at 13:32

## 2025-08-15 RX ADMIN — ROCURONIUM BROMIDE 50 MG: 10 INJECTION, SOLUTION INTRAVENOUS at 07:40

## 2025-08-15 RX ADMIN — OXYCODONE 5 MG: 5 TABLET ORAL at 17:26

## 2025-08-15 RX ADMIN — CYCLOBENZAPRINE 10 MG: 10 TABLET, FILM COATED ORAL at 15:54

## 2025-08-15 RX ADMIN — GABAPENTIN 300 MG: 300 CAPSULE ORAL at 22:06

## 2025-08-15 RX ADMIN — FENTANYL CITRATE 25 MCG: 0.05 INJECTION, SOLUTION INTRAMUSCULAR; INTRAVENOUS at 10:11

## 2025-08-15 RX ADMIN — SODIUM CHLORIDE AND POTASSIUM CHLORIDE: .9; .15 SOLUTION INTRAVENOUS at 13:32

## 2025-08-15 RX ADMIN — SODIUM CHLORIDE, SODIUM LACTATE, POTASSIUM CHLORIDE, AND CALCIUM CHLORIDE: .6; .31; .03; .02 INJECTION, SOLUTION INTRAVENOUS at 07:07

## 2025-08-15 ASSESSMENT — PAIN DESCRIPTION - LOCATION
LOCATION: BACK

## 2025-08-15 ASSESSMENT — PAIN - FUNCTIONAL ASSESSMENT
PAIN_FUNCTIONAL_ASSESSMENT: 0-10

## 2025-08-15 ASSESSMENT — PAIN SCALES - GENERAL
PAINLEVEL_OUTOF10: 6
PAINLEVEL_OUTOF10: 3
PAINLEVEL_OUTOF10: 0
PAINLEVEL_OUTOF10: 4
PAINLEVEL_OUTOF10: 8
PAINLEVEL_OUTOF10: 4
PAINLEVEL_OUTOF10: 5
PAINLEVEL_OUTOF10: 4
PAINLEVEL_OUTOF10: 0
PAINLEVEL_OUTOF10: 0
PAINLEVEL_OUTOF10: 4

## 2025-08-15 ASSESSMENT — PAIN DESCRIPTION - DESCRIPTORS
DESCRIPTORS: SORE;ACHING
DESCRIPTORS: SORE

## 2025-08-15 ASSESSMENT — PAIN DESCRIPTION - ORIENTATION
ORIENTATION: LEFT
ORIENTATION: RIGHT;LEFT

## 2025-08-16 VITALS
HEART RATE: 88 BPM | RESPIRATION RATE: 16 BRPM | OXYGEN SATURATION: 97 % | DIASTOLIC BLOOD PRESSURE: 72 MMHG | HEIGHT: 64 IN | TEMPERATURE: 97.5 F | BODY MASS INDEX: 30.39 KG/M2 | SYSTOLIC BLOOD PRESSURE: 99 MMHG | WEIGHT: 178 LBS

## 2025-08-16 LAB
ANION GAP SERPL CALCULATED.3IONS-SCNC: 14 MEQ/L (ref 9–15)
BUN SERPL-MCNC: 6 MG/DL (ref 6–20)
CALCIUM SERPL-MCNC: 8.3 MG/DL (ref 8.5–9.9)
CHLORIDE SERPL-SCNC: 102 MEQ/L (ref 95–107)
CO2 SERPL-SCNC: 20 MEQ/L (ref 20–31)
CREAT SERPL-MCNC: 0.54 MG/DL (ref 0.5–0.9)
ERYTHROCYTE [DISTWIDTH] IN BLOOD BY AUTOMATED COUNT: 11.7 % (ref 11.5–14.5)
GLUCOSE SERPL-MCNC: 94 MG/DL (ref 70–99)
HCT VFR BLD AUTO: 39.7 % (ref 37–47)
HGB BLD-MCNC: 13.8 G/DL (ref 12–16)
MAGNESIUM SERPL-MCNC: 1.8 MG/DL (ref 1.7–2.4)
MCH RBC QN AUTO: 30 PG (ref 27–31.3)
MCHC RBC AUTO-ENTMCNC: 34.8 % (ref 33–37)
MCV RBC AUTO: 86.3 FL (ref 79.4–94.8)
PHOSPHATE SERPL-MCNC: 3 MG/DL (ref 2.3–4.8)
PLATELET # BLD AUTO: 326 K/UL (ref 130–400)
POTASSIUM SERPL-SCNC: 4.2 MEQ/L (ref 3.4–4.9)
RBC # BLD AUTO: 4.6 M/UL (ref 4.2–5.4)
SODIUM SERPL-SCNC: 136 MEQ/L (ref 135–144)
WBC # BLD AUTO: 13.9 K/UL (ref 4.8–10.8)

## 2025-08-16 PROCEDURE — G0378 HOSPITAL OBSERVATION PER HR: HCPCS

## 2025-08-16 PROCEDURE — 85027 COMPLETE CBC AUTOMATED: CPT

## 2025-08-16 PROCEDURE — 84100 ASSAY OF PHOSPHORUS: CPT

## 2025-08-16 PROCEDURE — 2500000003 HC RX 250 WO HCPCS: Performed by: NEUROLOGICAL SURGERY

## 2025-08-16 PROCEDURE — 6360000002 HC RX W HCPCS: Performed by: NEUROLOGICAL SURGERY

## 2025-08-16 PROCEDURE — 83735 ASSAY OF MAGNESIUM: CPT

## 2025-08-16 PROCEDURE — 36415 COLL VENOUS BLD VENIPUNCTURE: CPT

## 2025-08-16 PROCEDURE — 97165 OT EVAL LOW COMPLEX 30 MIN: CPT

## 2025-08-16 PROCEDURE — 80048 BASIC METABOLIC PNL TOTAL CA: CPT

## 2025-08-16 PROCEDURE — 97161 PT EVAL LOW COMPLEX 20 MIN: CPT

## 2025-08-16 PROCEDURE — 6370000000 HC RX 637 (ALT 250 FOR IP): Performed by: NEUROLOGICAL SURGERY

## 2025-08-16 RX ADMIN — WATER 2000 MG: 1 INJECTION INTRAMUSCULAR; INTRAVENOUS; SUBCUTANEOUS at 00:36

## 2025-08-16 RX ADMIN — SODIUM CHLORIDE AND POTASSIUM CHLORIDE: .9; .15 SOLUTION INTRAVENOUS at 06:03

## 2025-08-16 RX ADMIN — OXYCODONE 5 MG: 5 TABLET ORAL at 02:55

## 2025-08-16 RX ADMIN — ACETAMINOPHEN 650 MG: 325 TABLET ORAL at 06:04

## 2025-08-16 RX ADMIN — ACETAMINOPHEN 650 MG: 325 TABLET ORAL at 00:36

## 2025-08-16 ASSESSMENT — PAIN DESCRIPTION - ORIENTATION
ORIENTATION: RIGHT;LEFT

## 2025-08-16 ASSESSMENT — PAIN SCALES - GENERAL
PAINLEVEL_OUTOF10: 3
PAINLEVEL_OUTOF10: 4

## 2025-08-16 ASSESSMENT — PAIN - FUNCTIONAL ASSESSMENT
PAIN_FUNCTIONAL_ASSESSMENT: 0-10

## 2025-08-16 ASSESSMENT — PAIN DESCRIPTION - LOCATION
LOCATION: BACK

## 2025-08-16 ASSESSMENT — PAIN DESCRIPTION - DESCRIPTORS
DESCRIPTORS: SORE
DESCRIPTORS: SORE
DESCRIPTORS: SORE;ACHING

## 2025-09-09 ENCOUNTER — APPOINTMENT (OUTPATIENT)
Dept: PRIMARY CARE | Facility: CLINIC | Age: 25
End: 2025-09-09
Payer: COMMERCIAL

## (undated) DEVICE — SUTURE VICRYL + SZ 0 L27IN ABSRB VLT L26MM UR-6 5/8 CIR VCP603H

## (undated) DEVICE — AGENT HEMOSTATIC SURG ORIGINAL ABS 2X14IN LOOSE KNIT 12/CA

## (undated) DEVICE — SUTURE VICRYL COAT SZ 4-0 L18IN ABSRB UD L19MM PS-2 1/2 CIR J496G

## (undated) DEVICE — BUR SURG MTCH HD 3X3.8 MM 14 CM FLUT LG BOR MIDAS REX 8

## (undated) DEVICE — AGENT HEMSTAT 1GM PORCINE GEL ABSRB PWD FOR CONT OOZING

## (undated) DEVICE — FOLEY TRAY 1 LAYR 16 FR 10 CC TEMP SNAPSECURE URO175816TS

## (undated) DEVICE — BLADE ES L6IN ELASTOMERIC COAT INSUL DURABLE BEND UPTO

## (undated) DEVICE — Device

## (undated) DEVICE — GOWN SURG L L43IN BLU POLY REINF BRTH FLM SL HK LOOP CLSR

## (undated) DEVICE — SEALANT HEMOSTATIC FAST PREP 10.75X5.75X13.25 IN 10 CC

## (undated) DEVICE — MARKER SURG SKIN GENTIAN VLT REG TIP W/ 6IN RUL DYNJSM01

## (undated) DEVICE — DRAPE PT Z FLD FOR IORT 3D RADIOGRAPHIC IMAG STERILE-Z

## (undated) DEVICE — SURGIFOAM SPNG SZ 100

## (undated) DEVICE — SYRINGE MED 10ML LUERLOCK TIP W/O SFTY DISP

## (undated) DEVICE — GLOVE ORANGE PI 7 1/2   MSG9075

## (undated) DEVICE — SUTURE ETHILON SZ 2-0 L18IN NONABSORBABLE BLK L26MM FS 3/8 664G

## (undated) DEVICE — SPONGE NEURO XRAY DETECT STRL 1X1IN 10PK

## (undated) DEVICE — SUTURE VICRYL + SZ 2-0 L18IN ABSRB UD CP-2 L26MM 1/2 CIR REV VCP762D

## (undated) DEVICE — COVER MICSCP W46XL120IN 4 BINOC GLS LENS LEICA

## (undated) DEVICE — KIT ANTIFOG 6CC W/ SOL ADH BK SPNG W/ RADPQ BND SFT PK

## (undated) DEVICE — MARKER SURG PASS SPHR NDI

## (undated) DEVICE — TUBING SUCT L2FT DIA0.187IN LNG CONN W/ CLMP FRAZ

## (undated) DEVICE — SUTURE VICRYL SZ 3-0 L18IN ABSRB VLT L26MM SH 1/2 CIR J774D

## (undated) DEVICE — SUTURE VICRYL RAPIDE SZ 3-0 L18IN ABSRB UD PS-2 L19MM 3/8 CIR VR497

## (undated) DEVICE — KIT JACK TBL PT CARE

## (undated) DEVICE — MANIFOLD SUCT 4 PRT 2 CANSTR FLTR DISP NEPTUNE 2

## (undated) DEVICE — PENCIL SMK EVAC BLADE COAT 70 MM BUTTON SWITCH NEPTUNE E-SEP

## (undated) DEVICE — GLOVE ORANGE PI 7   MSG9070

## (undated) DEVICE — COVER TBL W44XL90IN STD POLYPR REINF DISP CONVERTORS

## (undated) DEVICE — DRAPE SURG ST 3/4 SHEET W53XL77IN STD POLYPR 3 QTR DISP

## (undated) DEVICE — BLADE ES ELASTOMERIC COAT INSUL DURABLE BEND UPTO 90DEG

## (undated) DEVICE — BLADE SURG 15 TWIN BK CARBON STL STRL CISION LF DISP

## (undated) DEVICE — ADHESIVE SKIN CLOSURE WND 8.661X1.5 IN 22 CM LIQUIBAND SECUR

## (undated) DEVICE — DRESSING TRNSPAR W2.375XL2.75IN STD FRME STYL WTRPRF BARR